# Patient Record
Sex: FEMALE | Race: BLACK OR AFRICAN AMERICAN | NOT HISPANIC OR LATINO | Employment: FULL TIME | URBAN - METROPOLITAN AREA
[De-identification: names, ages, dates, MRNs, and addresses within clinical notes are randomized per-mention and may not be internally consistent; named-entity substitution may affect disease eponyms.]

---

## 2017-02-03 RX ORDER — ACYCLOVIR 50 MG/G
CREAM TOPICAL
Qty: 5 G | Refills: 0 | OUTPATIENT
Start: 2017-02-03

## 2017-02-23 ENCOUNTER — OFFICE VISIT (OUTPATIENT)
Dept: INTERNAL MEDICINE | Age: 37
End: 2017-02-23

## 2017-02-23 VITALS
HEART RATE: 90 BPM | BODY MASS INDEX: 24.4 KG/M2 | SYSTOLIC BLOOD PRESSURE: 118 MMHG | TEMPERATURE: 99 F | HEIGHT: 68 IN | OXYGEN SATURATION: 99 % | WEIGHT: 161 LBS | DIASTOLIC BLOOD PRESSURE: 62 MMHG

## 2017-02-23 DIAGNOSIS — J01.11 ACUTE RECURRENT FRONTAL SINUSITIS: Primary | ICD-10-CM

## 2017-02-23 DIAGNOSIS — B37.31 VAGINAL YEAST INFECTION: ICD-10-CM

## 2017-02-23 PROCEDURE — 99214 OFFICE O/P EST MOD 30 MIN: CPT | Performed by: NURSE PRACTITIONER

## 2017-02-23 RX ORDER — CETIRIZINE HYDROCHLORIDE 10 MG/1
TABLET ORAL DAILY
COMMUNITY
Start: 2013-04-16 | End: 2018-07-17 | Stop reason: ALTCHOICE

## 2017-02-23 RX ORDER — ACETAMINOPHEN 500 MG
TABLET ORAL
Refills: 0 | COMMUNITY
Start: 2017-02-03 | End: 2019-04-18

## 2017-02-23 RX ORDER — MOXIFLOXACIN HYDROCHLORIDE 400 MG/1
400 TABLET ORAL DAILY
Qty: 10 TABLET | Refills: 0 | Status: SHIPPED | OUTPATIENT
Start: 2017-02-23 | End: 2017-08-25

## 2017-02-23 RX ORDER — BACLOFEN 10 MG/1
20 TABLET ORAL DAILY
COMMUNITY
Start: 2017-01-06 | End: 2017-08-25

## 2017-02-23 RX ORDER — FLUCONAZOLE 150 MG/1
150 TABLET ORAL ONCE
Qty: 1 TABLET | Refills: 0 | Status: SHIPPED | OUTPATIENT
Start: 2017-02-23 | End: 2017-02-23

## 2017-02-23 NOTE — PROGRESS NOTES
Sera Xavier / 36 y.o. / female  02/23/2017      CC:  Main reason(s) for today's visit: Cough; Sinus Problem (drainage states low grade fever); and URI (pt states has a sinus infection x 2 weeks)      HPI:   She presents to the office with complaints of cough, sore throat, low grade fever, headache, and right-sided frontal sinus pain ×2 weeks.  Patient also reports increase oral drainage and nasal congestion.    The following portions of the patient's history were reviewed and updated as appropriate: past medical history and past surgical history.    ASSESSMENT & PLAN:    Problem List Items Addressed This Visit     None      Visit Diagnoses     Acute recurrent frontal sinusitis    -  Primary    Relevant Medications    moxifloxacin (AVELOX) 400 MG tablet    Vaginal yeast infection        Relevant Medications    fluconazole (DIFLUCAN) 150 MG tablet        No orders of the defined types were placed in this encounter.      · Summary/Discussion:     1.  Acute recurrent frontal sinusitis: Patient presents to the office with complaints of nasal congestion, right-sided frontal sinus pain, low-grade fever and headache.  Upon examination patient does have right-sided frontal sinus pressure.  Nasal mucosa is eyelid with swelling turbinates in the left near.  Patient's right ear appears to be red with some effusion.  Oral mucosa is pink with clear thin drainage.  No other abnormalities noted.  Patient was advised that we will call in an antibiotic. She is to continue nasal corticosteroid as previously ordered. Also increase her oral fluids.  Patient was advised to monitor for worsening of symptoms and contact the office if symptoms do not improve.  Patient does report that she typically will have a yeast infection with the use of antibiotics until Diflucan will also be called into her pharmacy.  Follow-up: Return if symptoms worsen or fail to improve.     No future  appointments.  ____________________________________________________________________    REVIEW OF SYSTEMS    Review of Systems   Constitutional: Positive for fever. Negative for activity change, appetite change, chills, diaphoresis and fatigue.   HENT: Positive for ear pain (Right ear pain), sinus pressure (Right side) and sore throat. Negative for congestion, dental problem, ear discharge, facial swelling, hearing loss, mouth sores, trouble swallowing and voice change.    Eyes: Negative.    Respiratory: Positive for cough. Negative for apnea, choking, chest tightness, shortness of breath, wheezing and stridor.    Cardiovascular: Negative for chest pain, palpitations and leg swelling.   Gastrointestinal: Negative for abdominal distention, abdominal pain, anal bleeding, blood in stool, constipation, diarrhea, nausea, rectal pain and vomiting.   Endocrine: Negative for cold intolerance and heat intolerance.   Genitourinary: Negative for decreased urine volume, difficulty urinating, dysuria, enuresis, flank pain, frequency, genital sores, hematuria and urgency.   Musculoskeletal: Negative for arthralgias, back pain, gait problem, joint swelling, myalgias, neck pain and neck stiffness.   Skin: Negative for color change, pallor, rash and wound.   Allergic/Immunologic: Negative for environmental allergies, food allergies and immunocompromised state.   Neurological: Positive for headaches. Negative for dizziness, tremors, seizures, syncope, facial asymmetry, speech difficulty, weakness, light-headedness and numbness.   Hematological: Negative for adenopathy. Does not bruise/bleed easily.   Psychiatric/Behavioral: Negative for agitation, confusion, decreased concentration, self-injury, sleep disturbance and suicidal ideas. The patient is not nervous/anxious and is not hyperactive.    All other systems reviewed and are negative.    Other: As noted per HPI      VITALS    Visit Vitals   • /62   • Pulse 90   • Temp 99 °F  "(37.2 °C)   • Ht 68\" (172.7 cm)   • Wt 161 lb (73 kg)   • SpO2 99%   • BMI 24.48 kg/m2     BP Readings from Last 3 Encounters:   02/23/17 118/62   02/26/15 132/76   05/16/14 110/70     Wt Readings from Last 3 Encounters:   02/23/17 161 lb (73 kg)   02/26/15 163 lb 0.1 oz (73.9 kg)   05/16/14 163 lb 0.1 oz (73.9 kg)      Body mass index is 24.48 kg/(m^2).    PHYSICAL EXAMINATION    Physical Exam   Constitutional: She is oriented to person, place, and time. She appears well-developed and well-nourished.   HENT:   Head: Normocephalic.   Right Ear: External ear and ear canal normal. A middle ear effusion is present.   Left Ear: Tympanic membrane, external ear and ear canal normal.   Nose: Mucosal edema and rhinorrhea present. Right sinus exhibits frontal sinus tenderness.   Mouth/Throat: Uvula is midline, oropharynx is clear and moist and mucous membranes are normal.   Eyes: Conjunctivae are normal. Pupils are equal, round, and reactive to light.   Neck: Normal range of motion.   Cardiovascular: Normal rate and regular rhythm.    Pulmonary/Chest: Effort normal and breath sounds normal.   Musculoskeletal: Normal range of motion.   Neurological: She is alert and oriented to person, place, and time.   Skin: Skin is warm and dry.   Psychiatric: She has a normal mood and affect. Her behavior is normal.   Vitals reviewed.      REVIEWED DATA:    Labs:   No results found for: NA, K, AST, ALT, BUN, CREATININE, EGFRIFNONA, EGFRIFAFRI    No results found for: GLU, HGBA1C    No results found for: LDL, HDL, TRIG, CHOLHDLRATIO    No results found for: TSH, FREET4       Lab Results   Component Value Date    WBC 3.33 (L) 10/10/2014    HGB 11.3 (L) 10/10/2014     10/10/2014        Imaging:        Medical Tests:        Summary of old records / correspondence / consultant report:        Request outside records:          ALLERGIES:    Penicillins and Sulfa antibiotics    MEDICATIONS:  Current Outpatient Prescriptions   Medication " Sig Dispense Refill   • baclofen (LIORESAL) 10 MG tablet Take 20 mg by mouth Daily.     • cetirizine (ZYRTEC ALLERGY) 10 MG tablet Take  by mouth Daily.     • MUCUS RELIEF D  MG tablet TK 1 T PO QID PRF CONGESTION  0   • fluconazole (DIFLUCAN) 150 MG tablet Take 1 tablet by mouth 1 (One) Time for 1 dose. 1 tablet 0   • moxifloxacin (AVELOX) 400 MG tablet Take 1 tablet by mouth Daily. 10 tablet 0     No current facility-administered medications for this visit.        [facility]    The following portions of the patient's history were reviewed and updated as appropriate: Allergies / Current Medications / Past Medical History / Surgical History / Social History / Family History    PROBLEM LIST:    There is no problem list on file for this patient.      PAST MEDICAL HX:    Past Medical History   Diagnosis Date   • H/O cold sores        PAST SURGICAL HX:    Past Surgical History   Procedure Laterality Date   • Hysterectomy         SOCIAL HX:    Social History     Social History   • Marital status: Single     Spouse name: N/A   • Number of children: N/A   • Years of education: N/A     Social History Main Topics   • Smoking status: Never Smoker   • Smokeless tobacco: Never Used      Comment: socially   • Alcohol use None   • Drug use: None   • Sexual activity: Not Asked     Other Topics Concern   • None     Social History Narrative   • None       FAMILY HX:    No family history on file.

## 2017-03-06 ENCOUNTER — TELEPHONE (OUTPATIENT)
Dept: INTERNAL MEDICINE | Age: 37
End: 2017-03-06

## 2017-03-06 DIAGNOSIS — J01.11 ACUTE RECURRENT FRONTAL SINUSITIS: ICD-10-CM

## 2017-03-06 RX ORDER — MOXIFLOXACIN HYDROCHLORIDE 400 MG/1
400 TABLET ORAL DAILY
Qty: 10 TABLET | Refills: 0 | Status: CANCELLED | OUTPATIENT
Start: 2017-03-06

## 2017-03-06 NOTE — TELEPHONE ENCOUNTER
Pt was seen by SOLO Loco 2/23/2017 for Acute Recurrent Frontal Sinusitis. Rx'd Avelox 400mg 1 PO QD.      Pt is now c/o Rt ear infection and green nasal drainage. Pt is requesting Rx for another round of Avelox 400mg.     Please advise.     KD

## 2017-03-06 NOTE — TELEPHONE ENCOUNTER
Error pt took this medication for 10 days and would have been done on the 4th. Therefore Gig would like pt to wait a few days for these antibiotics to take affect before prescribing anymore. Pt informed

## 2017-03-06 NOTE — TELEPHONE ENCOUNTER
Please call in Avelox, 400mg, daily for 5 days. If she does not improve, then she needs to come in to be evaluated.

## 2017-03-06 NOTE — TELEPHONE ENCOUNTER
Pt was seen by SOLO Loco 2/23/2017 for Acute Recurrent Frontal Sinusitis. Rx'd Avelox 400mg 1 PO QD.     Pt is now c/o Rt ear infection and green nasal drainage. Pt is requesting Rx for another round of Avelox 400mg.    Pt has not been seen by Dr. Sharif since 2/26/2015.    Please advise.    KD

## 2017-03-16 ENCOUNTER — TELEPHONE (OUTPATIENT)
Dept: INTERNAL MEDICINE | Age: 37
End: 2017-03-16

## 2017-08-09 ENCOUNTER — TELEPHONE (OUTPATIENT)
Dept: INTERNAL MEDICINE | Age: 37
End: 2017-08-09

## 2017-08-09 DIAGNOSIS — N63.20 LEFT BREAST LUMP: Primary | ICD-10-CM

## 2017-08-09 NOTE — TELEPHONE ENCOUNTER
Pt called stating she found a nodule in her left breast. Denies any tenderness. Pt asking who Dr Sharif would recommend her to see?   Pt said does not want to go to [facility].  Pt is aware that Dr Sharif is out today, but asking if anyone else could refer her out.  Pt's # [phone]  Thanks SP

## 2017-08-09 NOTE — TELEPHONE ENCOUNTER
She does not need a gynecology referral for this anyway.  Needs a breast surgery referral.  Have entered a referral to Dr. Kenan Maya who does this type of evaluation.

## 2017-08-25 ENCOUNTER — OFFICE VISIT (OUTPATIENT)
Dept: MAMMOGRAPHY | Facility: CLINIC | Age: 37
End: 2017-08-25

## 2017-08-25 ENCOUNTER — OFFICE VISIT (OUTPATIENT)
Dept: INTERNAL MEDICINE | Age: 37
End: 2017-08-25

## 2017-08-25 VITALS
OXYGEN SATURATION: 100 % | DIASTOLIC BLOOD PRESSURE: 78 MMHG | HEART RATE: 100 BPM | TEMPERATURE: 98.7 F | WEIGHT: 158 LBS | BODY MASS INDEX: 23.95 KG/M2 | HEIGHT: 68 IN | SYSTOLIC BLOOD PRESSURE: 144 MMHG

## 2017-08-25 VITALS
SYSTOLIC BLOOD PRESSURE: 118 MMHG | OXYGEN SATURATION: 99 % | BODY MASS INDEX: 24.1 KG/M2 | WEIGHT: 159 LBS | HEIGHT: 68 IN | HEART RATE: 88 BPM | TEMPERATURE: 98.2 F | DIASTOLIC BLOOD PRESSURE: 62 MMHG

## 2017-08-25 DIAGNOSIS — N63.0 LUMP OR MASS IN BREAST: Primary | ICD-10-CM

## 2017-08-25 DIAGNOSIS — R01.1 MURMUR, CARDIAC: Primary | ICD-10-CM

## 2017-08-25 PROCEDURE — 99213 OFFICE O/P EST LOW 20 MIN: CPT | Performed by: INTERNAL MEDICINE

## 2017-08-25 PROCEDURE — 99203 OFFICE O/P NEW LOW 30 MIN: CPT | Performed by: SURGERY

## 2017-08-25 RX ORDER — CLOBETASOL PROPIONATE 0.5 MG/G
AEROSOL, FOAM TOPICAL
Refills: 5 | COMMUNITY
Start: 2017-07-14 | End: 2018-07-17

## 2017-08-25 NOTE — PROGRESS NOTES
Chief Complaint: Sera Xavier is a 37 y.o.. female here today for Mass (left breast)        History of Present Illness:  Patient presents with breast mass.  She is a nice 37-year-old female who 2 months ago noticed a lump in the upper outer quadrant of the left breast.  She described it as being the size of a pea.  It was not tender and although it has not grown in size it has persisted.  There has been no change with her menstrual cycle.  She also gives no history of trauma to the breast.  Imaging studies have not been performed.  There is no past history of breast biopsies.  Her family history is negative for breast cancer or ovarian cancer.      Review of Systems:  Review of Systems   Skin:        The pt denies any noticeable changes to the skin of the breast.    All other systems reviewed and are negative.       Past Medical and Surgical History:  Breast Biopsy History:  Patient has not had a breast biopsy in the past.  Breast Cancer HIstory:  Patient does not have a past medical history of breast cancer.  Breast Operations, and year:  None    History   Smoking Status   • Never Smoker   Smokeless Tobacco   • Never Used     Comment: socially     Obstetric History:  Patient is postmenopausal due to removal of her uterus in the following year:2014   Number of pregnancies:1  Number of live births: 1  Number of abortions or miscarriages: 0  Age of delivery of first child: 22  Patient breast fed, for the following lenth of time:2 weeks  Length of time taking birth control pills:8 years  Patient has never taken hormone replacement    Past Surgical History:   Procedure Laterality Date   • HYSTERECTOMY         Past Medical History:   Diagnosis Date   • H/O cold sores    • Seasonal allergies        Prior Hospitalizations, other than for surgery or childbirth, and year:  none    Social History:  Patient is single.  Patient has 1 daughters.    Family History:  Family History   Problem Relation Age of Onset   • Alcohol  abuse Maternal Aunt    • Asthma Paternal Grandmother    • Diabetes Paternal Grandmother    • Heart attack Paternal Grandmother    • Heart failure Paternal Grandmother    • Hypertension Paternal Grandmother    • Alcohol abuse Paternal Grandfather    • Hyperthyroidism Mother        Vital Signs:  Vitals:    08/25/17 1026   BP: 118/62   Pulse: 88   Temp: 98.2 °F (36.8 °C)   SpO2: 99%       Medications:    Current Outpatient Prescriptions:     Current Outpatient Prescriptions:   •  cetirizine (ZYRTEC ALLERGY) 10 MG tablet, Take  by mouth Daily., Disp: , Rfl:   •  clobetasol (OLUX) 0.05 % topical foam, APPLY QAM AND AT BEDTIME VERY SPARINGLY, Disp: , Rfl: 5  •  MUCUS RELIEF D  MG tablet, TK 1 T PO QID PRF CONGESTION, Disp: , Rfl: 0    Physical Examination:  General Appearance:   Patient is in no distress.  She is well kept and has an average build.   Psychiatric:  Patient with appropriate mood and affect. Alert and oriented to self, time, and place.    Breast, RIGHT:  small sized, symmetric with the contralateral side.  Breast skin is without erythema, edema, rashes.  There are no visible abnormalities upon inspection during the arm-raising maneuver or with hands on hips in the sitting position. There is no nipple retraction, discharge or nipple/areolar skin changes.There are no masses palpable in the sitting or supine positions.    Breast, LEFT:  small sized, symmetric with the contralateral side.  Breast skin is without erythema, edema, rashes.  There are no visible abnormalities upon inspection during the arm-raising maneuver or with hands on hips in the sitting position. There is no nipple retraction, discharge or nipple/areolar skin changes.There is an 8 mm well circumscribed superficial mass 2 fingerbreadths out from the edge of the areola in the 2 o'clock position.  It is not attached to the skin in anyway and mobile.    Lymphatic:  There is no axillary, cervical, infraclavicular, or supraclavicular  adenopathy bilaterally.    Cardiovascular:  Heart rate and rhythm are regular.  Respiratory:  Lungs are clear bilaterally with no crackles or wheezes in any lung field.  Gastrointestinal:  Abdomen is soft, nondistended, and nontender.  There was no evidence of hepatosplenomegaly or abdominal mass.  She does have an umbilical ring present.  There are no scars from previous surgery.    Musculoskeletal:  Good strength in all 4 extremities.   There is good range of motion in both shoulders.        Assessment:  1. Lump or mass in breast          Plan:  I do feel there is a distinct lump in the breast.  Its features are generally benign in that it is well circumscribed and fairly smooth.  I would recommend imaging studies and therefore have ordered mammograms and an ultrasound.  I will be speaking with her after those results return.      CPT coding:    Next Appointment:  No Follow-up on file.

## 2017-08-25 NOTE — PROGRESS NOTES
"Sera Xavier / 37 y.o. / female  08/25/2017  VITALS    /78  Pulse 100  Temp 98.7 °F (37.1 °C)  Ht 68\" (172.7 cm)  Wt 158 lb (71.7 kg)  SpO2 100%  BMI 24.02 kg/m2  BP Readings from Last 3 Encounters:   08/25/17 144/78   08/25/17 118/62   02/23/17 118/62     Wt Readings from Last 3 Encounters:   08/25/17 158 lb (71.7 kg)   08/25/17 159 lb (72.1 kg)   02/23/17 161 lb (73 kg)      Body mass index is 24.02 kg/(m^2).    CC:  Main reason(s) for today's visit: Heart Murmur (per Dr Maya)      HPI:     Patient was being seen by breast surgery, Dr. Maya for breast lump on the left side.  She has upcoming ultrasound and mammographic imaging to be scheduled, by clinical characteristics surgery does not feel it is anything of concern at this time.    While being evaluated, a murmur was noted.  Patient does have a past medical history of murmur, echocardiogram was done while at [facility] in the remote past.  There is nothing on the care everywhere section of the computer.  She works out regularly at the gym and has noted no reduction in exercise tolerance nor orthostatic changes.    Patient Care Team:  John Sharif MD as PCP - General  John Sharif MD as PCP - Family Medicine    ____________________________________________________________________    ASSESSMENT & PLAN:    Problem List Items Addressed This Visit     None      Visit Diagnoses     Murmur, cardiac    -  Primary    Relevant Orders    Adult Transthoracic Echo Complete        Orders Placed This Encounter   Procedures   • Adult Transthoracic Echo Complete       Summary/Discussion:     · Number-one history of cardiac murmur, not demonstrable by my exam today.  Patient may well have a murmur of mitral valve prolapse.  To definitively identify this situation, I recommend echocardiogram which will be  scheduled at the hospital at her convenience.      Return if symptoms worsen or fail to improve.    No future " appointments.    ______________________________________________________    REVIEW OF SYSTEMS    Review of Systems   Respiratory: Negative for chest tightness and shortness of breath.    Cardiovascular: Negative for chest pain and palpitations.        No PND nor orthopnea noted.         PHYSICAL EXAMINATION    Physical Exam   Constitutional: She is oriented to person, place, and time. She appears well-developed and well-nourished. No distress.   Cardiovascular: Normal rate, regular rhythm, normal heart sounds and intact distal pulses.  Exam reveals no gallop and no friction rub.    No murmur heard.  No murmur audible with patient supine at 45°, left lateral decubitus position, standing upright and squatting.   Pulmonary/Chest: Effort normal and breath sounds normal. She has no wheezes. She has no rales.   Musculoskeletal: She exhibits no edema.   Neurological: She is alert and oriented to person, place, and time.   Skin: Skin is warm and dry. No rash noted.   Psychiatric: She has a normal mood and affect. Her behavior is normal. Judgment and thought content normal.   Nursing note and vitals reviewed.      REVIEWED DATA:    Labs:     No results found for: NA, K, AST, ALT, BUN, CREATININE, EGFRIFNONA, EGFRIFAFRI    No results found for: HGBA1C, GLU, MICROALBUR    No results found for: LDL, HDL, TRIG, CHOLHDLRATIO    No results found for: TSH, FREET4    Lab Results   Component Value Date    WBC 3.33 (L) 10/10/2014    HGB 11.3 (L) 10/10/2014     10/10/2014       Imaging:        Medical Tests:        Summary of old records / correspondence / consultant report:        Request outside records:        Allergies   Allergen Reactions   • Amoxicillin Hives   • Benzonatate      DO NOT HELP   • Levofloxacin Hives   • Penicillins    • Sulfa Antibiotics    • Sulfamethoxazole-Trimethoprim        Current Outpatient Prescriptions on File Prior to Visit   Medication Sig Dispense Refill   • cetirizine (ZYRTEC ALLERGY) 10 MG  tablet Take  by mouth Daily.     • clobetasol (OLUX) 0.05 % topical foam APPLY QAM AND AT BEDTIME VERY SPARINGLY  5   • MUCUS RELIEF D  MG tablet TK 1 T PO QID PRF CONGESTION  0   • [DISCONTINUED] baclofen (LIORESAL) 10 MG tablet Take 20 mg by mouth Daily.     • [DISCONTINUED] moxifloxacin (AVELOX) 400 MG tablet Take 1 tablet by mouth Daily. 10 tablet 0     No current facility-administered medications on file prior to visit.        PFSH:     The following portions of the patient's history were reviewed and updated as appropriate: Allergies / Current Medications / Past Medical History / Surgical History / Social History / Family History    Patient Active Problem List   Diagnosis   • Vascular headache       Past Medical History:   Diagnosis Date   • H/O cold sores    • Heart murmur    • Seasonal allergies        Past Surgical History:   Procedure Laterality Date   • HYSTERECTOMY         Social History     Social History   • Marital status: Single     Spouse name: N/A   • Number of children: 1   • Years of education: N/A     Occupational History   • [employer]      Social History Main Topics   • Smoking status: Never Smoker   • Smokeless tobacco: Never Used      Comment: socially   • Alcohol use Yes      Comment: rum once or twice a month   • Drug use: No   • Sexual activity: Defer      Comment: single     Other Topics Concern   • None     Social History Narrative       Family History   Problem Relation Age of Onset   • Alcohol abuse Maternal Aunt    • Asthma Paternal Grandmother    • Diabetes Paternal Grandmother    • Heart attack Paternal Grandmother    • Heart failure Paternal Grandmother    • Hypertension Paternal Grandmother    • Alcohol abuse Paternal Grandfather    • Hyperthyroidism Mother          **Dragon Disclaimer:   Much of this encounter note is an electronic transcription/translation of spoken language to printed text. The electronic translation of spoken language may permit erroneous, or at  times, nonsensical words or phrases to be inadvertently transcribed. Although I have reviewed the note for such errors, some may still exist.

## 2017-09-26 ENCOUNTER — APPOINTMENT (OUTPATIENT)
Dept: WOMENS IMAGING | Facility: HOSPITAL | Age: 37
End: 2017-09-26

## 2017-09-26 PROCEDURE — G0279 TOMOSYNTHESIS, MAMMO: HCPCS | Performed by: RADIOLOGY

## 2017-09-26 PROCEDURE — 77062 BREAST TOMOSYNTHESIS BI: CPT | Performed by: RADIOLOGY

## 2017-09-26 PROCEDURE — 77066 DX MAMMO INCL CAD BI: CPT | Performed by: RADIOLOGY

## 2017-09-26 PROCEDURE — 76641 ULTRASOUND BREAST COMPLETE: CPT | Performed by: RADIOLOGY

## 2017-09-26 PROCEDURE — G0204 DX MAMMO INCL CAD BI: HCPCS | Performed by: RADIOLOGY

## 2017-10-02 DIAGNOSIS — N63.0 LUMP OR MASS IN BREAST: Primary | ICD-10-CM

## 2017-10-19 ENCOUNTER — TELEPHONE (OUTPATIENT)
Dept: MAMMOGRAPHY | Facility: CLINIC | Age: 37
End: 2017-10-19

## 2017-10-19 DIAGNOSIS — N63.0 LUMP OR MASS IN BREAST: Primary | ICD-10-CM

## 2017-10-19 NOTE — TELEPHONE ENCOUNTER
The mammogram and ultrasound show multiple masses in both breasts.  These are virtually all consistent with cysts.  Some contained debris or are clustered.  6 month bilateral mammograms and ultrasounds have been recommended.  I left a message for her to call us regarding that.  I did go ahead and placed the orders today.

## 2017-10-30 ENCOUNTER — TELEPHONE (OUTPATIENT)
Dept: INTERNAL MEDICINE | Age: 37
End: 2017-10-30

## 2017-10-30 NOTE — TELEPHONE ENCOUNTER
Requesting a refill on Acalacyclovir 1mg. She has cold sores and has no medication.   Asha on [address] [phone]

## 2017-10-30 NOTE — TELEPHONE ENCOUNTER
Pt is requesting Rx for Acyclovir 1mg, previously Rx'd for h/o recurrent cold sores. However, only Rx in pt history is Zovirax 5% ex cream (SIG: apply to affected are Q 2 hrs.) in [employer].    Please advise.    KD

## 2017-10-31 DIAGNOSIS — B00.1 RECURRENT COLD SORES: Primary | ICD-10-CM

## 2017-10-31 RX ORDER — ACYCLOVIR 400 MG/1
400 TABLET ORAL 3 TIMES DAILY
Qty: 15 TABLET | Refills: 0 | Status: SHIPPED | OUTPATIENT
Start: 2017-10-31 | End: 2017-11-01 | Stop reason: ALTCHOICE

## 2017-11-01 DIAGNOSIS — Z86.19 HISTORY OF COLD SORES: Primary | ICD-10-CM

## 2017-11-01 RX ORDER — VALACYCLOVIR HYDROCHLORIDE 1 G/1
2000 TABLET, FILM COATED ORAL 2 TIMES DAILY
Qty: 4 TABLET | Refills: 0 | Status: SHIPPED | OUTPATIENT
Start: 2017-11-01 | End: 2018-07-17 | Stop reason: ALTCHOICE

## 2018-01-15 DIAGNOSIS — B00.1 RECURRENT COLD SORES: ICD-10-CM

## 2018-01-15 RX ORDER — ACYCLOVIR 400 MG/1
TABLET ORAL
Qty: 15 TABLET | Refills: 0 | Status: SHIPPED | OUTPATIENT
Start: 2018-01-15 | End: 2018-03-20 | Stop reason: SDUPTHER

## 2018-03-20 DIAGNOSIS — B00.1 RECURRENT COLD SORES: ICD-10-CM

## 2018-03-20 RX ORDER — ACYCLOVIR 400 MG/1
TABLET ORAL
Qty: 15 TABLET | Refills: 0 | Status: SHIPPED | OUTPATIENT
Start: 2018-03-20 | End: 2018-04-09 | Stop reason: SDUPTHER

## 2018-03-26 ENCOUNTER — OFFICE VISIT (OUTPATIENT)
Dept: INTERNAL MEDICINE | Age: 38
End: 2018-03-26

## 2018-03-26 VITALS
SYSTOLIC BLOOD PRESSURE: 118 MMHG | DIASTOLIC BLOOD PRESSURE: 76 MMHG | TEMPERATURE: 98.4 F | HEART RATE: 94 BPM | BODY MASS INDEX: 23.25 KG/M2 | HEIGHT: 68 IN | OXYGEN SATURATION: 99 % | WEIGHT: 153.4 LBS

## 2018-03-26 DIAGNOSIS — J01.00 SUBACUTE MAXILLARY SINUSITIS: Primary | ICD-10-CM

## 2018-03-26 PROCEDURE — 99213 OFFICE O/P EST LOW 20 MIN: CPT | Performed by: INTERNAL MEDICINE

## 2018-03-26 RX ORDER — FLUCONAZOLE 150 MG/1
150 TABLET ORAL ONCE
Qty: 1 TABLET | Refills: 0 | Status: SHIPPED | OUTPATIENT
Start: 2018-03-26 | End: 2018-03-26

## 2018-03-26 RX ORDER — MOXIFLOXACIN HYDROCHLORIDE 400 MG/1
400 TABLET ORAL DAILY
Qty: 10 TABLET | Refills: 0 | Status: SHIPPED | OUTPATIENT
Start: 2018-03-26 | End: 2019-04-18

## 2018-03-26 NOTE — PROGRESS NOTES
Subjective   Sera Xavier is a 37 y.o. female.     History of Present Illness patient presents today with upper respiratory symptoms for the past 3 weeks.  She notes facial congestion predominantly on the right side, she does have some dental discomfort with chewing with radiation to the right ear.  Drainage is purulent.  She is not coughing.  Home treatment mucus relief.  She is a nonsmoker.  Last menstrual period hysterectomy.  Her daughter also had a sinus infection approximately one month ago.    The following portions of the patient's history were reviewed and updated as appropriate: allergies, current medications, past medical history, past social history and problem list.    Review of Systems   Constitutional: Positive for fever. Negative for appetite change, chills and diaphoresis.        Fever was present last week but has not recurred since.   Gastrointestinal: Negative for diarrhea, nausea and vomiting.   Skin: Negative for rash.   Neurological: Negative for headaches.   Hematological: Negative for adenopathy.       Objective   Physical Exam   Constitutional: She is oriented to person, place, and time. She appears well-developed and well-nourished.   HENT:   Right Ear: Tympanic membrane and ear canal normal.   Left Ear: Tympanic membrane and ear canal normal.   Nose: Right sinus exhibits maxillary sinus tenderness. Right sinus exhibits no frontal sinus tenderness. Left sinus exhibits no maxillary sinus tenderness and no frontal sinus tenderness.   Mouth/Throat: No posterior oropharyngeal edema or posterior oropharyngeal erythema.   Neck: Normal range of motion. Neck supple.   Lymphadenopathy:     She has no cervical adenopathy.   Neurological: She is alert and oriented to person, place, and time.   Skin: Skin is warm and dry.   Psychiatric: She has a normal mood and affect. Her behavior is normal. Judgment and thought content normal.   Nursing note and vitals reviewed.      Assessment/Plan   Sera piedra  seen today for facial pain.    Diagnoses and all orders for this visit:    Subacute maxillary sinusitis    Other orders  -     moxifloxacin (AVELOX) 400 MG tablet; Take 1 tablet by mouth Daily.  -     fluconazole (DIFLUCAN) 150 MG tablet; Take 1 tablet by mouth 1 (One) Time for 1 dose.      Number-one sinusitis right maxillary sinus involvement.  Will treat with Avelox as above, patient is tolerated is the past with good result.  We'll also treat with Diflucan because she typically gets a yeast infection when treated with antibiotics.  We also discussed that she should discontinue the Zyrtec-D that she is taking and use plain Sudafed PE decongestant.  I also suggest Ocean Spray or times daily in her nose, vaporizer in her bedroom at night, and shower steam in the morning.  To follow-up with me if not improved in 10-14 days or sooner if her condition worsens.

## 2018-04-09 DIAGNOSIS — B00.1 RECURRENT COLD SORES: ICD-10-CM

## 2018-04-09 RX ORDER — ACYCLOVIR 400 MG/1
TABLET ORAL
Qty: 15 TABLET | Refills: 0 | Status: SHIPPED | OUTPATIENT
Start: 2018-04-09 | End: 2018-05-07 | Stop reason: SDUPTHER

## 2018-04-23 ENCOUNTER — TELEPHONE (OUTPATIENT)
Dept: MAMMOGRAPHY | Facility: CLINIC | Age: 38
End: 2018-04-23

## 2018-04-23 NOTE — TELEPHONE ENCOUNTER
Several attempts have been made to contact pt re: scheduling her diag mammo and us.      Last attempt to call pt by phone was 04/23/2018. Left message for pt to call back.      Unable to contact pt.      (See communications in the referral and scanned document of contact attempts.)      08/17/2018  One last attempt was made to contact patient on 08/09/2018, again left message. Unable to contact.    Letter sent by regular mail and registered letter also sent.

## 2018-05-07 DIAGNOSIS — B00.1 RECURRENT COLD SORES: ICD-10-CM

## 2018-05-07 RX ORDER — ACYCLOVIR 400 MG/1
TABLET ORAL
Qty: 15 TABLET | Refills: 0 | Status: SHIPPED | OUTPATIENT
Start: 2018-05-07 | End: 2018-06-08 | Stop reason: SDUPTHER

## 2018-06-08 DIAGNOSIS — B00.1 RECURRENT COLD SORES: ICD-10-CM

## 2018-06-08 RX ORDER — ACYCLOVIR 400 MG/1
TABLET ORAL
Qty: 15 TABLET | Refills: 0 | Status: SHIPPED | OUTPATIENT
Start: 2018-06-08 | End: 2018-06-27 | Stop reason: SDUPTHER

## 2018-06-27 DIAGNOSIS — B00.1 RECURRENT COLD SORES: ICD-10-CM

## 2018-06-27 RX ORDER — ACYCLOVIR 400 MG/1
TABLET ORAL
Qty: 15 TABLET | Refills: 0 | Status: SHIPPED | OUTPATIENT
Start: 2018-06-27 | End: 2018-07-18 | Stop reason: SDUPTHER

## 2018-07-17 ENCOUNTER — OFFICE VISIT (OUTPATIENT)
Dept: INTERNAL MEDICINE | Age: 38
End: 2018-07-17

## 2018-07-17 VITALS
WEIGHT: 147.7 LBS | SYSTOLIC BLOOD PRESSURE: 128 MMHG | HEART RATE: 110 BPM | OXYGEN SATURATION: 99 % | BODY MASS INDEX: 22.39 KG/M2 | TEMPERATURE: 98.9 F | DIASTOLIC BLOOD PRESSURE: 70 MMHG | HEIGHT: 68 IN

## 2018-07-17 DIAGNOSIS — S39.011A ABDOMINAL MUSCLE STRAIN, INITIAL ENCOUNTER: Primary | ICD-10-CM

## 2018-07-17 DIAGNOSIS — S39.012A LOW BACK STRAIN, INITIAL ENCOUNTER: ICD-10-CM

## 2018-07-17 PROCEDURE — 99213 OFFICE O/P EST LOW 20 MIN: CPT | Performed by: NURSE PRACTITIONER

## 2018-07-17 RX ORDER — LEVOCETIRIZINE DIHYDROCHLORIDE 5 MG/1
5 TABLET, FILM COATED ORAL EVERY EVENING
COMMUNITY

## 2018-07-17 RX ORDER — PREDNISONE 10 MG/1
TABLET ORAL
Refills: 0 | COMMUNITY
Start: 2018-04-16 | End: 2019-04-18

## 2018-07-17 NOTE — PATIENT INSTRUCTIONS
Low Back Strain    A strain is a stretch or tear in a muscle or the strong cords of tissue that attach muscle to bone (tendons). Strains of the lower back (lumbar spine) are a common cause of low back pain. A strain occurs when muscles or tendons are torn or are stretched beyond their limits. The muscles may become inflamed, resulting in pain and sudden muscle tightening (spasms). A strain can happen suddenly due to an injury (trauma), or it can develop gradually due to overuse.  There are three types of strains:  · Grade 1 is a mild strain involving a minor tear of the muscle fibers or tendons. This may cause some pain but no loss of muscle strength.  · Grade 2 is a moderate strain involving a partial tear of the muscle fibers or tendons. This causes more severe pain and some loss of muscle strength.  · Grade 3 is a severe strain involving a complete tear of the muscle or tendon. This causes severe pain and complete or nearly complete loss of muscle strength.    What are the causes?  This condition may be caused by:  · Trauma, such as a fall or a hit to the body.  · Twisting or overstretching the back. This may result from doing activities that require a lot of energy, such as lifting heavy objects.    What increases the risk?  The following factors may increase your risk of getting this condition:  · Playing contact sports.  · Participating in sports or activities that put excessive stress on the back and require a lot of bending and twisting, including:  ? Lifting weights or heavy objects.  ? Gymnastics.  ? Soccer.  ? Figure skating.  ? Snowboarding.  · Being overweight or obese.  · Having poor strength and flexibility.    What are the signs or symptoms?  Symptoms of this condition may include:  · Sharp or dull pain in the lower back that does not go away. Pain may extend to the buttocks.  · Stiffness.  · Limited range of motion.  · Inability to stand up straight due to stiffness or pain.  · Muscle spasms.    How  is this diagnosed?  This condition may be diagnosed based on:  · Your symptoms.  · Your medical history.  · A physical exam.  ? Your health care provider may push on certain areas of your back to determine the source of your pain.  ? You may be asked to bend forward, backward, and side to side to assess the severity of your pain and your range of motion.  · Imaging tests, such as:  ? X-rays.  ? MRI.    How is this treated?  Treatment for this condition may include:  · Applying heat and cold to the affected area.  · Medicines to help relieve pain and to relax your muscles (muscle relaxants).  · NSAIDs to help reduce swelling and discomfort.  · Physical therapy.    When your symptoms improve, it is important to gradually return to your normal routine as soon as possible to reduce pain, avoid stiffness, and avoid loss of muscle strength. Generally, symptoms should improve within 6 weeks of treatment. However, recovery time varies.  Follow these instructions at home:  Managing pain, stiffness, and swelling  · If directed, apply ice to the injured area during the first 24 hours after your injury.  ? Put ice in a plastic bag.  ? Place a towel between your skin and the bag.  ? Leave the ice on for 20 minutes, 2-3 times a day.  · If directed, apply heat to the affected area as often as told by your health care provider. Use the heat source that your health care provider recommends, such as a moist heat pack or a heating pad.  ? Place a towel between your skin and the heat source.  ? Leave the heat on for 20-30 minutes.  ? Remove the heat if your skin turns bright red. This is especially important if you are unable to feel pain, heat, or cold. You may have a greater risk of getting burned.  Activity  · Rest and return to your normal activities as told by your health care provider. Ask your health care provider what activities are safe for you.  · Avoid activities that take a lot of effort (are strenuous) for as long as told  by your health care provider.  · Do exercises as told by your health care provider.  General instructions    · Take over-the-counter and prescription medicines only as told by your health care provider.  · If you have questions or concerns about safety while taking pain medicine, talk with your health care provider.  · Do not drive or operate heavy machinery until you know how your pain medicine affects you.  · Do not use any tobacco products, such as cigarettes, chewing tobacco, and e-cigarettes. Tobacco can delay bone healing. If you need help quitting, ask your health care provider.  · Keep all follow-up visits as told by your health care provider. This is important.  How is this prevented?  · Warm up and stretch before being active.  · Cool down and stretch after being active.  · Give your body time to rest between periods of activity.  · Avoid:  ? Being physically inactive for long periods at a time.  ? Exercising or playing sports when you are tired or in pain.  · Use correct form when playing sports and lifting heavy objects.  · Use good posture when sitting and standing.  · Maintain a healthy weight.  · Sleep on a mattress with medium firmness to support your back.  · Make sure to use equipment that fits you, including shoes that fit well.  · Be safe and responsible while being active to avoid falls.  · Do at least 150 minutes of moderate-intensity exercise each week, such as brisk walking or water aerobics. Try a form of exercise that takes stress off your back, such as swimming or stationary cycling.  · Maintain physical fitness, including:  ? Strength.  ? Flexibility.  ? Cardiovascular fitness.  ? Endurance.  Contact a health care provider if:  · Your back pain does not improve after 6 weeks of treatment.  · Your symptoms get worse.  Get help right away if:  · Your back pain is severe.  · You are unable to stand or walk.  · You develop pain in your legs.  · You develop weakness in your buttocks or  legs.  · You have difficulty controlling when you urinate or when you have a bowel movement.  This information is not intended to replace advice given to you by your health care provider. Make sure you discuss any questions you have with your health care provider.  Document Released: 12/18/2006 Document Revised: 08/24/2017 Document Reviewed: 09/28/2016  [employer] Interactive Patient Education © 2017 [employer]

## 2018-07-17 NOTE — PROGRESS NOTES
"Roseline Xavier is a 38 y.o. female.     Muscle Pain   This is a new problem. The current episode started yesterday. The problem occurs constantly. The problem is unchanged. The pain occurs in the context of an injury (Had been lifting lots of boxes lately. Reports poor lifting technique the day prior to the onset of pain. ). Pain location: right flank and right lower back. The pain is mild. Exacerbated by: bending, twisting. Pertinent negatives include no abdominal pain, fatigue or fever. (She presents today because she reported feeling a \"lump\" to her right lateral side and is concerned she may have caused some injury. ) Past treatments include OTC NSAID. The treatment provided mild relief. Swelling location: right lateral flank. There is no history of chronic back pain.        The following portions of the patient's history were reviewed and updated as appropriate: allergies, current medications, past family history, past medical history, past social history, past surgical history and problem list.    Review of Systems   Constitutional: Negative for chills, fatigue and fever.   Gastrointestinal: Negative for abdominal pain.       Objective   Physical Exam   Constitutional: She appears well-developed and well-nourished. She is active. She does not appear ill. No distress.   Cardiovascular: Normal rate, regular rhythm and normal heart sounds.    Pulmonary/Chest: Effort normal and breath sounds normal. She has no decreased breath sounds. She has no wheezes. She has no rhonchi. She has no rales.   Musculoskeletal:        Lumbar back: She exhibits pain. She exhibits no tenderness and no bony tenderness.        Back:    Neurological: She is alert.   Nursing note and vitals reviewed.        Assessment/Plan   Problems Addressed this Visit     None      Visit Diagnoses     Abdominal muscle strain, initial encounter    -  Primary    Low back strain, initial encounter            1. Abdominal muscle strain, " initial encounter  No abnormality appreciated on exam. Likely muscle strain of low back and obliques from excessive lifting and poor lifting technique. Discussed treatment with heating pad, OTC NSAIDs, avoidance of lifting until improved. Follow-up as needed if no improvement in 1-2 weeks.    2. Low back strain, initial encounter

## 2018-07-18 DIAGNOSIS — B00.1 RECURRENT COLD SORES: ICD-10-CM

## 2018-07-18 RX ORDER — ACYCLOVIR 400 MG/1
TABLET ORAL
Qty: 15 TABLET | Refills: 5
Start: 2018-07-18 | End: 2019-01-07 | Stop reason: SDUPTHER

## 2018-07-23 DIAGNOSIS — B00.1 RECURRENT COLD SORES: ICD-10-CM

## 2018-07-23 RX ORDER — ACYCLOVIR 400 MG/1
TABLET ORAL
Qty: 15 TABLET | Refills: 5 | Status: SHIPPED | OUTPATIENT
Start: 2018-07-23 | End: 2019-01-07 | Stop reason: SDUPTHER

## 2018-09-26 ENCOUNTER — TELEPHONE (OUTPATIENT)
Dept: MAMMOGRAPHY | Facility: CLINIC | Age: 38
End: 2018-09-26

## 2018-09-26 NOTE — TELEPHONE ENCOUNTER
Please received letter, please call patient concerning missed appointments. Patient would like to explain.

## 2019-01-07 DIAGNOSIS — B00.1 RECURRENT COLD SORES: ICD-10-CM

## 2019-01-07 RX ORDER — ACYCLOVIR 400 MG/1
TABLET ORAL
Qty: 15 TABLET | Refills: 0 | Status: SHIPPED | OUTPATIENT
Start: 2019-01-07 | End: 2019-02-19 | Stop reason: SDUPTHER

## 2019-02-18 NOTE — TELEPHONE ENCOUNTER
Pt will need to either get established with a provider here or find her own provider. We cannot continue to prescribe this medication because pt has not been seen since 07.17.18 and has no provider listed.   Please advise patient.

## 2019-02-18 NOTE — TELEPHONE ENCOUNTER
Pt is requesting a rx refill of Acyclovir 400mg for reoccurring cold sores.    Asha/Ramon #[phone].

## 2019-02-18 NOTE — TELEPHONE ENCOUNTER
Pt is scheduled to est care with SOLO Haywood on 4/18/19.    Pls advise rx refill of Acyclovir 400mg for reoccurring cold sores.

## 2019-02-19 DIAGNOSIS — B00.1 RECURRENT COLD SORES: ICD-10-CM

## 2019-02-19 RX ORDER — ACYCLOVIR 400 MG/1
400 TABLET ORAL 3 TIMES DAILY
Qty: 15 TABLET | Refills: 0 | Status: SHIPPED | OUTPATIENT
Start: 2019-02-19 | End: 2019-02-25 | Stop reason: SDUPTHER

## 2019-02-25 DIAGNOSIS — B00.1 RECURRENT COLD SORES: ICD-10-CM

## 2019-02-25 RX ORDER — ACYCLOVIR 400 MG/1
400 TABLET ORAL 3 TIMES DAILY
Qty: 15 TABLET | Refills: 0 | Status: SHIPPED | OUTPATIENT
Start: 2019-02-25 | End: 2019-04-18 | Stop reason: SDUPTHER

## 2019-04-18 ENCOUNTER — OFFICE VISIT (OUTPATIENT)
Dept: INTERNAL MEDICINE | Age: 39
End: 2019-04-18

## 2019-04-18 VITALS
TEMPERATURE: 97.4 F | BODY MASS INDEX: 23.28 KG/M2 | OXYGEN SATURATION: 100 % | DIASTOLIC BLOOD PRESSURE: 80 MMHG | WEIGHT: 153.6 LBS | HEIGHT: 68 IN | SYSTOLIC BLOOD PRESSURE: 132 MMHG | HEART RATE: 99 BPM

## 2019-04-18 DIAGNOSIS — Z00.00 PREVENTATIVE HEALTH CARE: ICD-10-CM

## 2019-04-18 DIAGNOSIS — B00.1 RECURRENT COLD SORES: Primary | ICD-10-CM

## 2019-04-18 DIAGNOSIS — Z76.89 ENCOUNTER TO ESTABLISH CARE: ICD-10-CM

## 2019-04-18 DIAGNOSIS — R13.10 SWALLOWING DYSFUNCTION: ICD-10-CM

## 2019-04-18 LAB
25(OH)D3+25(OH)D2 SERPL-MCNC: 46.4 NG/ML (ref 30–100)
ALBUMIN SERPL-MCNC: 5.3 G/DL (ref 3.5–5.2)
ALBUMIN/GLOB SERPL: 2.5 G/DL
ALP SERPL-CCNC: 51 U/L (ref 39–117)
ALT SERPL-CCNC: 19 U/L (ref 1–33)
AST SERPL-CCNC: 14 U/L (ref 1–32)
BASOPHILS # BLD AUTO: 0.03 10*3/MM3 (ref 0–0.2)
BASOPHILS NFR BLD AUTO: 0.9 % (ref 0–1.5)
BILIRUB SERPL-MCNC: 1.7 MG/DL (ref 0.2–1.2)
BUN SERPL-MCNC: 15 MG/DL (ref 6–20)
BUN/CREAT SERPL: 16.9 (ref 7–25)
CALCIUM SERPL-MCNC: 9.9 MG/DL (ref 8.6–10.5)
CHLORIDE SERPL-SCNC: 104 MMOL/L (ref 98–107)
CO2 SERPL-SCNC: 24.8 MMOL/L (ref 22–29)
CREAT SERPL-MCNC: 0.89 MG/DL (ref 0.57–1)
EOSINOPHIL # BLD AUTO: 0.04 10*3/MM3 (ref 0–0.4)
EOSINOPHIL NFR BLD AUTO: 1.2 % (ref 0.3–6.2)
ERYTHROCYTE [DISTWIDTH] IN BLOOD BY AUTOMATED COUNT: 12 % (ref 12.3–15.4)
GLOBULIN SER CALC-MCNC: 2.1 GM/DL
GLUCOSE SERPL-MCNC: 83 MG/DL (ref 65–99)
HCT VFR BLD AUTO: 39.2 % (ref 34–46.6)
HGB BLD-MCNC: 13.1 G/DL (ref 12–15.9)
IMM GRANULOCYTES # BLD AUTO: 0 10*3/MM3 (ref 0–0.05)
IMM GRANULOCYTES NFR BLD AUTO: 0 % (ref 0–0.5)
LYMPHOCYTES # BLD AUTO: 0.99 10*3/MM3 (ref 0.7–3.1)
LYMPHOCYTES NFR BLD AUTO: 29.8 % (ref 19.6–45.3)
MCH RBC QN AUTO: 31.3 PG (ref 26.6–33)
MCHC RBC AUTO-ENTMCNC: 33.4 G/DL (ref 31.5–35.7)
MCV RBC AUTO: 93.8 FL (ref 79–97)
MONOCYTES # BLD AUTO: 0.29 10*3/MM3 (ref 0.1–0.9)
MONOCYTES NFR BLD AUTO: 8.7 % (ref 5–12)
NEUTROPHILS # BLD AUTO: 1.97 10*3/MM3 (ref 1.4–7)
NEUTROPHILS NFR BLD AUTO: 59.4 % (ref 42.7–76)
NRBC BLD AUTO-RTO: 0 /100 WBC (ref 0–0)
PLATELET # BLD AUTO: 148 10*3/MM3 (ref 140–450)
POTASSIUM SERPL-SCNC: 4.5 MMOL/L (ref 3.5–5.2)
PROT SERPL-MCNC: 7.4 G/DL (ref 6–8.5)
RBC # BLD AUTO: 4.18 10*6/MM3 (ref 3.77–5.28)
SODIUM SERPL-SCNC: 140 MMOL/L (ref 136–145)
TSH SERPL DL<=0.005 MIU/L-ACNC: 1.47 MIU/ML (ref 0.27–4.2)
WBC # BLD AUTO: 3.32 10*3/MM3 (ref 3.4–10.8)

## 2019-04-18 PROCEDURE — 99214 OFFICE O/P EST MOD 30 MIN: CPT | Performed by: NURSE PRACTITIONER

## 2019-04-18 RX ORDER — ACYCLOVIR 400 MG/1
400 TABLET ORAL 3 TIMES DAILY
Qty: 30 TABLET | Refills: 2 | Status: SHIPPED | OUTPATIENT
Start: 2019-04-18 | End: 2020-03-17

## 2019-04-18 RX ORDER — CHLORAL HYDRATE 500 MG
CAPSULE ORAL
COMMUNITY

## 2019-04-18 RX ORDER — OMEPRAZOLE 20 MG/1
20 TABLET, DELAYED RELEASE ORAL DAILY
COMMUNITY
Start: 2019-04-18

## 2019-04-18 NOTE — PROGRESS NOTES
[facility] [place] INTERNAL MEDICINE  Michelle Xavier / 39 y.o. / female  04/18/2019      ASSESSMENT & PLAN:    Problem List Items Addressed This Visit        Other    Recurrent cold sores - Primary    Relevant Medications    acyclovir (ZOVIRAX) 400 MG tablet      Other Visit Diagnoses     Swallowing dysfunction        Relevant Medications    omeprazole OTC (PRILOSEC OTC) 20 MG EC tablet    Other Relevant Orders    Ambulatory Referral to Gastroenterology    Encounter to establish care        Preventative health care        Relevant Orders    CBC & Differential    Comprehensive Metabolic Panel    TSH Rfx On Abnormal To Free T4    Vitamin D 25 Hydroxy        Orders Placed This Encounter   Procedures   • Comprehensive Metabolic Panel   • TSH Rfx On Abnormal To Free T4   • Vitamin D 25 Hydroxy   • Ambulatory Referral to Gastroenterology   • CBC & Differential     New Medications Ordered This Visit   Medications   • omeprazole OTC (PRILOSEC OTC) 20 MG EC tablet     Sig: Take 1 tablet by mouth Daily.   • acyclovir (ZOVIRAX) 400 MG tablet     Sig: Take 1 tablet by mouth 3 (Three) Times a Day. Take no more than 5 doses a day.     Dispense:  30 tablet     Refill:  2       Summary/Discussion:    1. Recurrent cold sores  Refills provided today to use as needed.    - acyclovir (ZOVIRAX) 400 MG tablet; Take 1 tablet by mouth 3 (Three) Times a Day. Take no more than 5 doses a day.  Dispense: 30 tablet; Refill: 2    2. Swallowing dysfunction  Patient likely needs EGD evaluation for swallowing difficulties/possible esophagitis.  Recommended can take over-the-counter Prilosec as needed for any burning or discomfort symptoms.  Referral placed to GI for further evaluation and management.    - omeprazole OTC (PRILOSEC OTC) 20 MG EC tablet; Take 1 tablet by mouth Daily.  - Ambulatory Referral to Gastroenterology    3. Encounter to establish care      4. Preventative health care    - CBC & Differential  - Comprehensive  "Metabolic Panel  - TSH Rfx On Abnormal To Free T4  - Vitamin D 25 Hydroxy        Return in about 1 year (around 4/18/2020) for Annual physical.    ____________________________________________________________________    MEDICATIONS  Current Outpatient Medications   Medication Sig Dispense Refill   • acyclovir (ZOVIRAX) 400 MG tablet Take 1 tablet by mouth 3 (Three) Times a Day. Take no more than 5 doses a day. 30 tablet 2   • Ascorbic Acid (VITAMIN C ER PO) Take  by mouth.     • BIOTIN PO Take  by mouth.     • COLLAGEN PO Take  by mouth.     • Cyanocobalamin (VITAMIN B12 PO) Take  by mouth.     • GLUCOSAMINE-CHONDROITIN PO Take  by mouth.     • levocetirizine (XYZAL) 5 MG tablet Take 5 mg by mouth Every Evening.     • Methylsulfonylmethane (MSM PO) Take  by mouth.     • Omega-3 Fatty Acids (FISH OIL) 1000 MG capsule capsule Take  by mouth Daily With Breakfast.     • omeprazole OTC (PRILOSEC OTC) 20 MG EC tablet Take 1 tablet by mouth Daily.       No current facility-administered medications for this visit.           VITALS:    Visit Vitals  /80   Pulse 99   Temp 97.4 °F (36.3 °C)   Ht 172.7 cm (68\")   Wt 69.7 kg (153 lb 9.6 oz)   SpO2 100%   BMI 23.35 kg/m²       BP Readings from Last 3 Encounters:   04/18/19 132/80   07/17/18 128/70   03/26/18 118/76     Wt Readings from Last 3 Encounters:   04/18/19 69.7 kg (153 lb 9.6 oz)   07/17/18 67 kg (147 lb 11.2 oz)   03/26/18 69.6 kg (153 lb 6.4 oz)      Body mass index is 23.35 kg/m².    CC:  Main reason(s) for today's visit: Recurrent Cold Sores (med refill; transfer from Dr. Sharif)      HPI: Patient here for medication refills and to establish care.  She was a previous patient of Dr. Sharif.    History of herpes simplex of the mouth.  Uses acyclovir on occasion as needed for outbreaks.     She also c/o recurrent episodes of discomfort when swallowing medications. She is on quite a few OTC supplemental medications. Reports having had history of previous symptoms in " the past, was evaluated with barium swallow study, told it could possibly be allergy-related. She reports symptoms have returned as of February and feels as if medications are getting stuck in lower esophagus when she swallows. She has not been taking anything for her symptoms.     Patient Care Team:  Michelle Lewis APRN as PCP - General (Internal Medicine)  John Sharif MD (Inactive) as PCP - Family Medicine    ____________________________________________________________________    REVIEW OF SYSTEMS    Review of Systems   Constitutional: Negative for activity change and unexpected weight change.   HENT: Positive for trouble swallowing.    Gastrointestinal: Negative for abdominal pain, nausea and vomiting.   Endocrine: Negative for cold intolerance and heat intolerance.         PHYSICAL EXAMINATION    Physical Exam   Constitutional: She is oriented to person, place, and time. Vital signs are normal. She appears well-developed and well-nourished. She is cooperative. She does not appear ill. No distress.   Cardiovascular: Normal rate, regular rhythm, S1 normal, S2 normal and normal heart sounds.   No murmur heard.  Pulmonary/Chest: Effort normal and breath sounds normal. She has no decreased breath sounds. She has no wheezes. She has no rhonchi. She has no rales.   Neurological: She is alert and oriented to person, place, and time.   Skin: Skin is warm, dry and intact.   Psychiatric: She has a normal mood and affect. Her speech is normal and behavior is normal. Judgment and thought content normal. Cognition and memory are normal.   Nursing note and vitals reviewed.      REVIEWED DATA:    Labs:     No results found for: NA, K, AST, ALT, BUN, CREATININE, EGFRIFNONA, EGFRIFAFRI    No results found for: HGBA1C, GLUCOSE, MICROALBUR    No results found for: LDL, HDL, TRIG, CHOLHDLRATIO    No results found for: TSH, FREET4    Lab Results   Component Value Date    WBC 3.33 (L) 10/10/2014    HGB 11.3 (L) 10/10/2014      10/10/2014       No results found for: PROTEIN, GLUCOSEU, BLOODU, NITRITEU, LEUKOCYTESUR    Imaging:         Medical Tests:         Summary of old records / correspondence / consultant report:         Request outside records:         ALLERGIES  Allergies   Allergen Reactions   • Amoxicillin Hives   • Benzonatate      DO NOT HELP   • Levofloxacin Hives   • Penicillins    • Sulfa Antibiotics    • Sulfamethoxazole-Trimethoprim         PFSH:     The following portions of the patient's history were reviewed and updated as appropriate: Allergies / Current Medications / Past Medical History / Surgical History / Social History / Family History    PROBLEM LIST   Patient Active Problem List   Diagnosis   • Vascular headache   • Recurrent cold sores       PAST MEDICAL HISTORY  Past Medical History:   Diagnosis Date   • Cyst of breast    • H/O cold sores    • Heart murmur    • Recurrent sinusitis    • Seasonal allergies        SURGICAL HISTORY  Past Surgical History:   Procedure Laterality Date   • HYSTERECTOMY      partial lap hysterectomy; no cervix        SOCIAL HISTORY  Social History     Socioeconomic History   • Marital status: Single     Spouse name: Not on file   • Number of children: 1   • Years of education: Not on file   • Highest education level: Not on file   Occupational History   • Occupation: [employer]   Tobacco Use   • Smoking status: Never Smoker   • Smokeless tobacco: Never Used   • Tobacco comment: socially   Substance and Sexual Activity   • Alcohol use: Yes     Comment: rum once or twice a month   • Drug use: No   • Sexual activity: Defer     Comment: single       FAMILY HISTORY  Family History   Problem Relation Age of Onset   • Alcohol abuse Maternal Aunt    • Cancer Maternal Aunt    • Asthma Paternal Grandmother    • Diabetes Paternal Grandmother    • Heart attack Paternal Grandmother    • Heart failure Paternal Grandmother    • Hypertension Paternal Grandmother    • Alcohol abuse Paternal  Grandfather    • Hyperthyroidism Mother    • Colon polyps Mother    • No Known Problems Father          **Lora Disclaimer:   Much of this encounter note is an electronic transcription/translation of spoken language to printed text. The electronic translation of spoken language may permit erroneous, or at times, nonsensical words or phrases to be inadvertently transcribed. Although I have reviewed the note for such errors, some may still exist.

## 2019-04-18 NOTE — PATIENT INSTRUCTIONS
Esophagitis    Esophagitis is inflammation of the esophagus. The esophagus is the tube that carries food and liquids from your mouth to your stomach. Esophagitis can cause soreness or pain in the esophagus. This condition can make it difficult and painful to swallow.  What are the causes?  Most causes of esophagitis are not serious. Common causes of this condition include:  · Gastroesophageal reflux disease (GERD). This is when stomach contents move back up into the esophagus (reflux).  · Repeated vomiting.  · An allergic-type reaction, especially caused by food allergies (eosinophilic esophagitis).  · Injury to the esophagus by swallowing large pills with or without water, or swallowing certain types of medicines.  · Swallowing (ingesting) harmful chemicals, such as household cleaning products.  · Heavy alcohol use.  · An infection of the esophagus. This most often occurs in people who have a weakened immune system.  · Radiation or chemotherapy treatment for cancer.  · Certain diseases such as sarcoidosis, Crohn disease, and scleroderma.    What are the signs or symptoms?  Symptoms of this condition include:  · Difficult or painful swallowing.  · Pain with swallowing acidic liquids, such as citrus juices.  · Pain with burping.  · Chest pain.  · Difficulty breathing.  · Nausea.  · Vomiting.  · Pain in the abdomen.  · Weight loss.  · Ulcers in the mouth.  · Patches of white material in the mouth (candidiasis).  · Fever.  · Coughing up blood or vomiting blood.  · Stool that is black, tarry, or bright red.    How is this diagnosed?  Your health care provider will take a medical history and perform a physical exam. You may also have other tests, including:  · An endoscopy to examine your stomach and esophagus with a small camera.  · A test that measures the acidity level in your esophagus.  · A test that measures how much pressure is on your esophagus.  · A barium swallow or modified barium swallow to show the shape,  size, and functioning of your esophagus.  · Allergy tests.    How is this treated?  Treatment for this condition depends on the cause of your esophagitis. In some cases, steroids or other medicines may be given to help relieve your symptoms or to treat the underlying cause of your condition. You may have to make some lifestyle changes, such as:  · Avoiding alcohol.  · Quitting smoking.  · Changing your diet.  · Exercising.  · Changing your sleep habits and your sleep environment.    Follow these instructions at home:  Take these actions to decrease your discomfort and to help avoid complications.  Diet  · Follow a diet as recommended by your health care provider. This may involve avoiding foods and drinks such as:  ? Coffee and tea (with or without caffeine).  ? Drinks that contain alcohol.  ? Energy drinks and sports drinks.  ? Carbonated drinks or sodas.  ? Chocolate and cocoa.  ? Peppermint and mint flavorings.  ? Garlic and onions.  ? Horseradish.  ? Spicy and acidic foods, including peppers, chili powder, dillon powder, vinegar, hot sauces, and barbecue sauce.  ? Citrus fruit juices and citrus fruits, such as oranges, manjinder, and limes.  ? Tomato-based foods, such as red sauce, chili, salsa, and pizza with red sauce.  ? Fried and fatty foods, such as donuts, french fries, potato chips, and high-fat dressings.  ? High-fat meats, such as hot dogs and fatty cuts of red and white meats, such as rib eye steak, sausage, ham, and castañeda.  ? High-fat dairy items, such as whole milk, butter, and cream cheese.  · Eat small, frequent meals instead of large meals.  · Avoid drinking large amounts of liquid with your meals.  · Avoid eating meals during the 2-3 hours before bedtime.  · Avoid lying down right after you eat.  · Do not exercise right after you eat.  · Avoid foods and drinks that seem to make your symptoms worse.  General instructions  · Pay attention to any changes in your symptoms.  · Take over-the-counter and  prescription medicines only as told by your health care provider. Do not take aspirin, ibuprofen, or other NSAIDs unless your health care provider told you to do so.  · If you have trouble taking pills, use a pill splitter to decrease the size of the pill. This will decrease the chance of the pill getting stuck or injuring your esophagus on the way down. Also, drink water after you take a pill.  · Do not use any tobacco products, including cigarettes, chewing tobacco, and e-cigarettes. If you need help quitting, ask your health care provider.  · Wear loose-fitting clothing. Do not wear anything tight around your waist that causes pressure on your abdomen.  · Raise (elevate) the head of your bed about 6 inches (15 cm).  · Try to reduce your stress, such as with yoga or meditation. If you need help reducing stress, ask your health care provider.  · If you are overweight, reduce your weight to an amount that is healthy for you. Ask your health care provider for guidance about a safe weight loss goal.  · Keep all follow-up visits as told by your health care provider. This is important.  Contact a health care provider if:  · You have new symptoms.  · You have unexplained weight loss.  · You have difficulty swallowing, or it hurts to swallow.  · You have wheezing or a persistent cough.  · Your symptoms do not improve with treatment.  · You have frequent heartburn for more than two weeks.  Get help right away if:  · You have severe pain in your arms, neck, jaw, teeth, or back.  · You feel sweaty, dizzy, or light-headed.  · You have chest pain or shortness of breath.  · You vomit and your vomit looks like blood or coffee grounds.  · Your stool is bloody or black.  · You have a fever.  · You cannot swallow, drink, or eat.  This information is not intended to replace advice given to you by your health care provider. Make sure you discuss any questions you have with your health care provider.  Document Released: 01/25/2006  Document Revised: 05/25/2017 Document Reviewed: 04/13/2016  [employer] Interactive Patient Education © 2019 [employer]

## 2019-04-19 ENCOUNTER — TELEPHONE (OUTPATIENT)
Dept: INTERNAL MEDICINE | Age: 39
End: 2019-04-19

## 2019-04-19 DIAGNOSIS — R17 ELEVATED BILIRUBIN: Primary | ICD-10-CM

## 2019-04-19 NOTE — TELEPHONE ENCOUNTER
LVM for pt, advising to hold supplements, and come in for fasting labs in 2-4 weeks. Pt was also advised of some symptoms of elevated bilirubin, and that Gilbert's Syndrome is asymptomatic.    KD

## 2019-04-19 NOTE — TELEPHONE ENCOUNTER
Because there is not much research data published on supplements, I cannot say for sure whether these effect bilirubin levels. I do not think glucosamine-chondroitin would have any effect. She may want to hold these supplements for now and recheck labs in the next 2-4 weeks.     Gilbert's syndrome is benign and asymptomatic.      Symptoms to watch for with elevated bilirubin is nausea, vomiting, jaundice, right upper abdominal pain.

## 2019-04-19 NOTE — TELEPHONE ENCOUNTER
Pt called re: lab results, specifically bilirubin.    The pt asked if her supplements, Triflex (glucosamine-chondtroitin) and MSM (Methylsulfonylmethane) might be effecting bilirubin levels.    Pt also wanted to know if this elevation, or build up, of bilirubin would cause any noticeable physical symptoms, or change how she feels.    Please advise.     She also had questions regarding Gilbert's Syndrome, wanting to know if this is dangerous, or should she be concerned.    KD

## 2019-06-27 ENCOUNTER — OFFICE VISIT (OUTPATIENT)
Dept: GASTROENTEROLOGY | Facility: CLINIC | Age: 39
End: 2019-06-27

## 2019-06-27 VITALS
BODY MASS INDEX: 22.88 KG/M2 | HEART RATE: 96 BPM | HEIGHT: 68 IN | SYSTOLIC BLOOD PRESSURE: 130 MMHG | DIASTOLIC BLOOD PRESSURE: 85 MMHG | WEIGHT: 151 LBS

## 2019-06-27 DIAGNOSIS — R13.19 ESOPHAGEAL DYSPHAGIA: ICD-10-CM

## 2019-06-27 DIAGNOSIS — K21.9 GASTROESOPHAGEAL REFLUX DISEASE, ESOPHAGITIS PRESENCE NOT SPECIFIED: Primary | ICD-10-CM

## 2019-06-27 PROCEDURE — 99204 OFFICE O/P NEW MOD 45 MIN: CPT | Performed by: INTERNAL MEDICINE

## 2019-06-27 RX ORDER — SODIUM CHLORIDE, SODIUM LACTATE, POTASSIUM CHLORIDE, CALCIUM CHLORIDE 600; 310; 30; 20 MG/100ML; MG/100ML; MG/100ML; MG/100ML
30 INJECTION, SOLUTION INTRAVENOUS CONTINUOUS
Status: CANCELLED | OUTPATIENT
Start: 2019-07-12

## 2019-06-27 RX ORDER — FLUTICASONE PROPIONATE 50 MCG
2 SPRAY, SUSPENSION (ML) NASAL DAILY
COMMUNITY
End: 2020-11-06 | Stop reason: ALTCHOICE

## 2019-06-27 RX ORDER — CETIRIZINE HYDROCHLORIDE 10 MG/1
10 TABLET ORAL DAILY
COMMUNITY

## 2019-06-27 NOTE — PROGRESS NOTES
Subjective   Sera Xavier is a 39 y.o.. female is being seen for consultation today at the request of SOLO Araujo    Chief Complaint   Patient presents with   • Difficulty Swallowing   • Heartburn       History of Present Illness  Patient has a history over the last several months of dysphasia, particular with solids and large pills.  She has frequent reflux as well.  She recently took omeprazole and that seems to have helped some.  She describes the symptoms as moderate.  Her discomfort occurs at the midsternal level.  She has numerous environmental allergies and routinely sees an allergist.  She has had no hematemesis, melena, or hematochezia.    The following portions of the patient's history were reviewed and updated as appropriate: allergies, current medications, past family history, past medical history, past social history, past surgical history and problem list.      Current Outpatient Medications:   •  cetirizine (zyrTEC) 10 MG tablet, Take 10 mg by mouth Daily., Disp: , Rfl:   •  fluticasone (FLONASE) 50 MCG/ACT nasal spray, 2 sprays into the nostril(s) as directed by provider Daily., Disp: , Rfl:   •  omeprazole OTC (PRILOSEC OTC) 20 MG EC tablet, Take 1 tablet by mouth Daily., Disp: , Rfl:   •  acyclovir (ZOVIRAX) 400 MG tablet, Take 1 tablet by mouth 3 (Three) Times a Day. Take no more than 5 doses a day., Disp: 30 tablet, Rfl: 2  •  Ascorbic Acid (VITAMIN C ER PO), Take  by mouth., Disp: , Rfl:   •  BIOTIN PO, Take  by mouth., Disp: , Rfl:   •  COLLAGEN PO, Take  by mouth., Disp: , Rfl:   •  Cyanocobalamin (VITAMIN B12 PO), Take  by mouth., Disp: , Rfl:   •  GLUCOSAMINE-CHONDROITIN PO, Take  by mouth., Disp: , Rfl:   •  levocetirizine (XYZAL) 5 MG tablet, Take 5 mg by mouth Every Evening., Disp: , Rfl:   •  Methylsulfonylmethane (MSM PO), Take  by mouth., Disp: , Rfl:   •  Omega-3 Fatty Acids (FISH OIL) 1000 MG capsule capsule, Take  by mouth Daily With Breakfast., Disp: , Rfl:     Family  History   Problem Relation Age of Onset   • Alcohol abuse Maternal Aunt    • Cancer Maternal Aunt    • Colon cancer Maternal Aunt    • Liver cancer Maternal Aunt    • Asthma Paternal Grandmother    • Diabetes Paternal Grandmother    • Heart attack Paternal Grandmother    • Heart failure Paternal Grandmother    • Hypertension Paternal Grandmother    • Alcohol abuse Paternal Grandfather    • Hyperthyroidism Mother    • Colon polyps Mother    • No Known Problems Father        Review of Systems   Constitutional: Negative for appetite change, diaphoresis, fatigue, fever and unexpected weight change.   HENT: Positive for trouble swallowing. Negative for hearing loss, mouth sores and sore throat.    Eyes: Negative for pain and redness.   Respiratory: Negative for choking and shortness of breath.    Cardiovascular: Negative for chest pain and leg swelling.   Gastrointestinal: Negative for abdominal distention, abdominal pain, anal bleeding, blood in stool, constipation, diarrhea, nausea, rectal pain and vomiting.   Genitourinary: Negative for flank pain and hematuria.   Musculoskeletal: Negative for arthralgias and joint swelling.   Skin: Negative for color change and rash.   Allergic/Immunologic: Negative for food allergies and immunocompromised state.   Neurological: Negative for dizziness, seizures and headaches.   Hematological: Does not bruise/bleed easily.   Psychiatric/Behavioral: Negative for confusion, sleep disturbance and suicidal ideas. The patient is not nervous/anxious.        Objective   Physical Exam   Constitutional: She is oriented to person, place, and time. She appears well-developed and well-nourished.   HENT:   Head: Normocephalic and atraumatic.   Right Ear: External ear normal.   Left Ear: External ear normal.   Nose: Nose normal.   Eyes: Conjunctivae are normal. Pupils are equal, round, and reactive to light.   Neck: Neck supple. No thyromegaly present.   Cardiovascular: Normal heart sounds. Exam  reveals no gallop and no friction rub.   No murmur heard.  Pulmonary/Chest: Effort normal and breath sounds normal.   Abdominal: Soft. Bowel sounds are normal. She exhibits no distension and no mass. There is no tenderness.   Musculoskeletal: She exhibits no edema.   Neurological: She is alert and oriented to person, place, and time.   Skin: No rash noted. No erythema.   Psychiatric: She has a normal mood and affect. Her behavior is normal.   Nursing note and vitals reviewed.      Pertinent old records were reviewed.     Assessment/Plan   Problems Addressed this Visit        Digestive    Gastroesophageal reflux disease - Primary    Relevant Orders    Case Request (Completed)    Esophageal dysphagia    Relevant Orders    Case Request (Completed)        It would not surprise me if the patient ends up having eosinophilic esophagitis.  In any case we will schedule her for an EGD.  She is to remain on omeprazole up until that time.

## 2019-07-12 ENCOUNTER — ANESTHESIA EVENT (OUTPATIENT)
Dept: GASTROENTEROLOGY | Facility: HOSPITAL | Age: 39
End: 2019-07-12

## 2019-07-12 ENCOUNTER — ANESTHESIA (OUTPATIENT)
Dept: GASTROENTEROLOGY | Facility: HOSPITAL | Age: 39
End: 2019-07-12

## 2019-07-12 ENCOUNTER — HOSPITAL ENCOUNTER (OUTPATIENT)
Facility: HOSPITAL | Age: 39
Setting detail: HOSPITAL OUTPATIENT SURGERY
Discharge: HOME | End: 2019-07-12
Attending: INTERNAL MEDICINE | Admitting: INTERNAL MEDICINE

## 2019-07-12 VITALS
SYSTOLIC BLOOD PRESSURE: 116 MMHG | OXYGEN SATURATION: 100 % | HEART RATE: 65 BPM | HEIGHT: 68 IN | TEMPERATURE: 98.6 F | DIASTOLIC BLOOD PRESSURE: 78 MMHG | WEIGHT: 155.19 LBS | RESPIRATION RATE: 16 BRPM | BODY MASS INDEX: 23.52 KG/M2

## 2019-07-12 DIAGNOSIS — K21.9 GASTROESOPHAGEAL REFLUX DISEASE, ESOPHAGITIS PRESENCE NOT SPECIFIED: ICD-10-CM

## 2019-07-12 DIAGNOSIS — R13.19 ESOPHAGEAL DYSPHAGIA: ICD-10-CM

## 2019-07-12 PROCEDURE — 43239 EGD BIOPSY SINGLE/MULTIPLE: CPT | Performed by: INTERNAL MEDICINE

## 2019-07-12 PROCEDURE — 88305 TISSUE EXAM BY PATHOLOGIST: CPT | Performed by: INTERNAL MEDICINE

## 2019-07-12 PROCEDURE — 43450 DILATE ESOPHAGUS 1/MULT PASS: CPT | Performed by: INTERNAL MEDICINE

## 2019-07-12 PROCEDURE — 25010000002 PROPOFOL 10 MG/ML EMULSION: Performed by: ANESTHESIOLOGY

## 2019-07-12 PROCEDURE — 88342 IMHCHEM/IMCYTCHM 1ST ANTB: CPT | Performed by: INTERNAL MEDICINE

## 2019-07-12 RX ORDER — SODIUM CHLORIDE, SODIUM LACTATE, POTASSIUM CHLORIDE, CALCIUM CHLORIDE 600; 310; 30; 20 MG/100ML; MG/100ML; MG/100ML; MG/100ML
30 INJECTION, SOLUTION INTRAVENOUS CONTINUOUS
Status: DISCONTINUED | OUTPATIENT
Start: 2019-07-12 | End: 2019-07-12 | Stop reason: HOSPADM

## 2019-07-12 RX ORDER — LIDOCAINE HYDROCHLORIDE 20 MG/ML
INJECTION, SOLUTION INFILTRATION; PERINEURAL AS NEEDED
Status: DISCONTINUED | OUTPATIENT
Start: 2019-07-12 | End: 2019-07-12 | Stop reason: SURG

## 2019-07-12 RX ORDER — PROPOFOL 10 MG/ML
VIAL (ML) INTRAVENOUS AS NEEDED
Status: DISCONTINUED | OUTPATIENT
Start: 2019-07-12 | End: 2019-07-12 | Stop reason: SURG

## 2019-07-12 RX ADMIN — PROPOFOL 50 MG: 10 INJECTION, EMULSION INTRAVENOUS at 09:20

## 2019-07-12 RX ADMIN — SODIUM CHLORIDE, POTASSIUM CHLORIDE, SODIUM LACTATE AND CALCIUM CHLORIDE 30 ML/HR: 600; 310; 30; 20 INJECTION, SOLUTION INTRAVENOUS at 09:02

## 2019-07-12 RX ADMIN — PROPOFOL 50 MG: 10 INJECTION, EMULSION INTRAVENOUS at 09:18

## 2019-07-12 RX ADMIN — PROPOFOL 50 MG: 10 INJECTION, EMULSION INTRAVENOUS at 09:23

## 2019-07-12 RX ADMIN — LIDOCAINE HYDROCHLORIDE 40 MG: 20 INJECTION, SOLUTION INFILTRATION; PERINEURAL at 09:18

## 2019-07-12 NOTE — ANESTHESIA POSTPROCEDURE EVALUATION
"Patient: Sera Xavier    Procedure Summary     Date:  07/12/19 Room / Location:   [facility]    Anesthesia Start:  0912 Anesthesia Stop:  0933    Procedure:  ESOPHAGOGASTRODUODENOSCOPY WITH COLD BIOPSIES WITH 44 FR ABRAMS DILATATION (N/A Esophagus) Diagnosis:       Gastroesophageal reflux disease, esophagitis presence not specified      Esophageal dysphagia      (Gastroesophageal reflux disease, esophagitis presence not specified [K21.9])      (Esophageal dysphagia [R13.10])    Surgeon:  Blade Olmedo MD Provider:  Devendra Prabhakar MD    Anesthesia Type:  MAC ASA Status:  2          Anesthesia Type: MAC  Last vitals  BP   109/70 (07/12/19 0839)   Temp   37 °C (98.6 °F) (07/12/19 0839)   Pulse   77 (07/12/19 0839)   Resp   20 (07/12/19 0839)     SpO2   100 % (07/12/19 0839)     Post Anesthesia Care and Evaluation    Patient location during evaluation: bedside  Patient participation: complete - patient participated  Level of consciousness: awake  Pain score: 2  Pain management: adequate  Airway patency: patent  Anesthetic complications: No anesthetic complications  PONV Status: none  Cardiovascular status: acceptable  Respiratory status: acceptable  Hydration status: acceptable    Comments: BP (P) 117/79   Pulse (P) 74   Temp 37 °C (98.6 °F) (Oral)   Resp (P) 16   Ht 171.5 cm (67.5\")   Wt 70.4 kg (155 lb 3 oz)   SpO2 (P) 100%   BMI 23.95 kg/m²         "

## 2019-07-12 NOTE — ANESTHESIA PREPROCEDURE EVALUATION
Anesthesia Evaluation     Patient summary reviewed and Nursing notes reviewed   NPO Solid Status: > 8 hours  NPO Liquid Status: > 2 hours           Airway   Mallampati: II  TM distance: >3 FB  Neck ROM: full  Dental - normal exam     Pulmonary - negative pulmonary ROS and normal exam   Cardiovascular - normal exam    (+) valvular problems/murmurs murmur,       Neuro/Psych- negative ROS  GI/Hepatic/Renal/Endo    (+)  GERD,      Musculoskeletal (-) negative ROS    Abdominal    Substance History - negative use     OB/GYN negative ob/gyn ROS         Other                        Anesthesia Plan    ASA 2     MAC     Anesthetic plan, all risks, benefits, and alternatives have been provided, discussed and informed consent has been obtained with: patient.

## 2019-07-16 ENCOUNTER — TELEPHONE (OUTPATIENT)
Dept: GASTROENTEROLOGY | Facility: CLINIC | Age: 39
End: 2019-07-16

## 2019-07-16 LAB
CYTO UR: NORMAL
LAB AP CASE REPORT: NORMAL
PATH REPORT.FINAL DX SPEC: NORMAL
PATH REPORT.GROSS SPEC: NORMAL

## 2019-08-12 ENCOUNTER — OFFICE VISIT (OUTPATIENT)
Dept: GASTROENTEROLOGY | Facility: CLINIC | Age: 39
End: 2019-08-12

## 2019-08-12 VITALS — HEART RATE: 78 BPM | SYSTOLIC BLOOD PRESSURE: 115 MMHG | DIASTOLIC BLOOD PRESSURE: 74 MMHG

## 2019-08-12 DIAGNOSIS — K21.9 GASTROESOPHAGEAL REFLUX DISEASE, ESOPHAGITIS PRESENCE NOT SPECIFIED: Primary | ICD-10-CM

## 2019-08-12 PROCEDURE — 99213 OFFICE O/P EST LOW 20 MIN: CPT | Performed by: INTERNAL MEDICINE

## 2019-08-12 NOTE — PROGRESS NOTES
Subjective   Sera Xavier is a 39 y.o.. female is here today for follow-up.    Chief Complaint   Patient presents with   • Difficulty Swallowing     EGD FU     History of Present Illness  Patient recently underwent EGD, esophageal biopsies and dilation for dysphasia and heartburn.  The examination appeared to be highly suspicious for eosinophilic esophagitis, and multiple proximal and distal biopsies were obtained.  She was treated with omeprazole 20 mg daily and she says that she is doing much better with paucity of GI symptoms at this time.  Interestingly, the biopsies did not demonstrate evidence of eosinophilic esophagitis.    The following portions of the patient's history were reviewed and updated as appropriate: allergies, current medications, past family history, past medical history, past social history, past surgical history and problem list.      Current Outpatient Medications:   •  acyclovir (ZOVIRAX) 400 MG tablet, Take 1 tablet by mouth 3 (Three) Times a Day. Take no more than 5 doses a day., Disp: 30 tablet, Rfl: 2  •  Ascorbic Acid (VITAMIN C ER PO), Take  by mouth., Disp: , Rfl:   •  BIOTIN PO, Take  by mouth., Disp: , Rfl:   •  cetirizine (zyrTEC) 10 MG tablet, Take 10 mg by mouth Daily., Disp: , Rfl:   •  COLLAGEN PO, Take  by mouth., Disp: , Rfl:   •  Cyanocobalamin (VITAMIN B12 PO), Take  by mouth., Disp: , Rfl:   •  fluticasone (FLONASE) 50 MCG/ACT nasal spray, 2 sprays into the nostril(s) as directed by provider Daily., Disp: , Rfl:   •  GLUCOSAMINE-CHONDROITIN PO, Take  by mouth., Disp: , Rfl:   •  levocetirizine (XYZAL) 5 MG tablet, Take 5 mg by mouth Every Evening., Disp: , Rfl:   •  Methylsulfonylmethane (MSM PO), Take  by mouth., Disp: , Rfl:   •  Omega-3 Fatty Acids (FISH OIL) 1000 MG capsule capsule, Take  by mouth Daily With Breakfast., Disp: , Rfl:   •  omeprazole OTC (PRILOSEC OTC) 20 MG EC tablet, Take 1 tablet by mouth Daily., Disp: , Rfl:     Family History   Problem Relation  Age of Onset   • Alcohol abuse Maternal Aunt    • Cancer Maternal Aunt    • Colon cancer Maternal Aunt    • Liver cancer Maternal Aunt    • Asthma Paternal Grandmother    • Diabetes Paternal Grandmother    • Heart attack Paternal Grandmother    • Heart failure Paternal Grandmother    • Hypertension Paternal Grandmother    • Alcohol abuse Paternal Grandfather    • Hyperthyroidism Mother    • Colon polyps Mother    • No Known Problems Father        Review of Systems   Respiratory: Negative for shortness of breath.    Cardiovascular: Negative for chest pain.   All other systems reviewed and are negative.      Objective   Physical Exam   Constitutional: She is oriented to person, place, and time. She appears well-developed and well-nourished.   HENT:   Head: Normocephalic and atraumatic.   Right Ear: External ear normal.   Left Ear: External ear normal.   Eyes: Conjunctivae and EOM are normal. Pupils are equal, round, and reactive to light.   Pulmonary/Chest: Effort normal.   Neurological: She is alert and oriented to person, place, and time.   Psychiatric: She has a normal mood and affect. Her behavior is normal. Judgment and thought content normal.   Nursing note and vitals reviewed.      Pertinent laboratory results were reviewed.  and Pertinent old records were reviewed.     Assessment/Plan   Problems Addressed this Visit        Digestive    Gastroesophageal reflux disease - Primary        I am surprised that the biopsies were negative for eosinophilic esophagitis.  In any case the patient has responded well symptomatically to modest acid suppression and I would recommend that she continue this long-term and have her renal chemistries checked perhaps 2 or 3 times a year.  She is to return should her symptoms worsen or fail to respond to PPI therapy.

## 2020-03-17 DIAGNOSIS — B00.1 RECURRENT COLD SORES: ICD-10-CM

## 2020-03-17 RX ORDER — ACYCLOVIR 400 MG/1
TABLET ORAL
Qty: 30 TABLET | Refills: 2 | Status: SHIPPED | OUTPATIENT
Start: 2020-03-17 | End: 2020-10-26

## 2020-03-18 DIAGNOSIS — Z00.00 PREVENTATIVE HEALTH CARE: Primary | ICD-10-CM

## 2020-03-19 ENCOUNTER — RESULTS ENCOUNTER (OUTPATIENT)
Dept: INTERNAL MEDICINE | Age: 40
End: 2020-03-19

## 2020-03-19 DIAGNOSIS — Z00.00 PREVENTATIVE HEALTH CARE: ICD-10-CM

## 2020-04-01 ENCOUNTER — TELEPHONE (OUTPATIENT)
Dept: INTERNAL MEDICINE | Age: 40
End: 2020-04-01

## 2020-04-01 NOTE — TELEPHONE ENCOUNTER
LVM FOR PT TO CALL AND GET LAB AND PHYSICAL FOR April RESCHEDULED TO 3-4 MONTH OUT. HUB OK TO SCHEDULE.

## 2020-04-13 ENCOUNTER — TELEPHONE (OUTPATIENT)
Dept: INTERNAL MEDICINE | Age: 40
End: 2020-04-13

## 2020-10-26 DIAGNOSIS — B00.1 RECURRENT COLD SORES: ICD-10-CM

## 2020-10-26 RX ORDER — ACYCLOVIR 400 MG/1
TABLET ORAL
Qty: 30 TABLET | Refills: 2 | Status: SHIPPED | OUTPATIENT
Start: 2020-10-26 | End: 2021-01-21

## 2020-11-06 ENCOUNTER — OFFICE VISIT (OUTPATIENT)
Dept: INTERNAL MEDICINE | Age: 40
End: 2020-11-06

## 2020-11-06 VITALS
BODY MASS INDEX: 24.4 KG/M2 | SYSTOLIC BLOOD PRESSURE: 118 MMHG | OXYGEN SATURATION: 100 % | DIASTOLIC BLOOD PRESSURE: 66 MMHG | HEIGHT: 68 IN | RESPIRATION RATE: 16 BRPM | HEART RATE: 99 BPM | WEIGHT: 161 LBS | TEMPERATURE: 97.1 F

## 2020-11-06 DIAGNOSIS — B00.1 RECURRENT COLD SORES: ICD-10-CM

## 2020-11-06 DIAGNOSIS — K21.9 GASTROESOPHAGEAL REFLUX DISEASE, UNSPECIFIED WHETHER ESOPHAGITIS PRESENT: Primary | ICD-10-CM

## 2020-11-06 DIAGNOSIS — R17 ELEVATED BILIRUBIN: ICD-10-CM

## 2020-11-06 DIAGNOSIS — Z79.899 ENCOUNTER FOR MONITORING PROTON PUMP INHIBITOR THERAPY: ICD-10-CM

## 2020-11-06 DIAGNOSIS — Z51.81 ENCOUNTER FOR MONITORING PROTON PUMP INHIBITOR THERAPY: ICD-10-CM

## 2020-11-06 DIAGNOSIS — J30.9 ALLERGIC RHINITIS, UNSPECIFIED SEASONALITY, UNSPECIFIED TRIGGER: ICD-10-CM

## 2020-11-06 LAB
ALBUMIN SERPL-MCNC: 4.6 G/DL (ref 3.5–5.2)
ALBUMIN/GLOB SERPL: 2.3 G/DL
ALP SERPL-CCNC: 67 U/L (ref 39–117)
ALT SERPL-CCNC: 23 U/L (ref 1–33)
AST SERPL-CCNC: 15 U/L (ref 1–32)
BILIRUB SERPL-MCNC: 0.6 MG/DL (ref 0–1.2)
BUN SERPL-MCNC: 12 MG/DL (ref 6–20)
BUN/CREAT SERPL: 13.5 (ref 7–25)
CALCIUM SERPL-MCNC: 9.3 MG/DL (ref 8.6–10.5)
CHLORIDE SERPL-SCNC: 102 MMOL/L (ref 98–107)
CO2 SERPL-SCNC: 27 MMOL/L (ref 22–29)
CREAT SERPL-MCNC: 0.89 MG/DL (ref 0.57–1)
GLOBULIN SER CALC-MCNC: 2 GM/DL
GLUCOSE SERPL-MCNC: 65 MG/DL (ref 65–99)
MAGNESIUM SERPL-MCNC: 2 MG/DL (ref 1.6–2.6)
POTASSIUM SERPL-SCNC: 4.2 MMOL/L (ref 3.5–5.2)
PROT SERPL-MCNC: 6.6 G/DL (ref 6–8.5)
SODIUM SERPL-SCNC: 137 MMOL/L (ref 136–145)

## 2020-11-06 PROCEDURE — 99214 OFFICE O/P EST MOD 30 MIN: CPT | Performed by: NURSE PRACTITIONER

## 2020-11-06 RX ORDER — TRIAMCINOLONE ACETONIDE 55 UG/1
2 SPRAY, METERED NASAL DAILY
COMMUNITY

## 2020-11-06 NOTE — PROGRESS NOTES
[facility] [place] [facility]  Michelle Xavier / 40 y.o. / female  11/06/2020      ASSESSMENT & PLAN:    Problem List Items Addressed This Visit        Digestive    Gastroesophageal reflux disease - Primary    Relevant Medications    omeprazole OTC (PRILOSEC OTC) 20 MG EC tablet    Other Relevant Orders    Comprehensive Metabolic Panel    Magnesium    Ambulatory Referral to Gastroenterology       Other    Recurrent cold sores    Relevant Medications    acyclovir (ZOVIRAX) 400 MG tablet      Other Visit Diagnoses     Elevated bilirubin        Relevant Orders    Comprehensive Metabolic Panel    Encounter for monitoring proton pump inhibitor therapy        Relevant Orders    Comprehensive Metabolic Panel    Magnesium    Allergic rhinitis, unspecified seasonality, unspecified trigger        Relevant Medications    Triamcinolone Acetonide (NASACORT) 55 MCG/ACT nasal inhaler        Orders Placed This Encounter   Procedures   • Comprehensive Metabolic Panel   • Magnesium   • Ambulatory Referral to Gastroenterology     No orders of the defined types were placed in this encounter.      Summary/Discussion:    1. Gastroesophageal reflux disease, unspecified whether esophagitis present  She appears to be rather anxious about possible long-term side effects of medication.  We discussed in detail potential for kidney dysfunction, B12 deficiency, and magnesium depletion.  Will check labs today, advised patient to establish care with new gastroenterologist to discuss whether or not it would be appropriate for her to come off of this PPI.  Continue magnesium and B12 supplementation at this time.     - Comprehensive Metabolic Panel  - Magnesium  - Ambulatory Referral to Gastroenterology    2. Recurrent cold sores    3. Elevated bilirubin    - Comprehensive Metabolic Panel    4. Encounter for monitoring proton pump inhibitor therapy    - Comprehensive Metabolic Panel  - Magnesium    5. Allergic rhinitis, unspecified  "seasonality, unspecified trigger  Refill today.     - Triamcinolone Acetonide (NASACORT) 55 MCG/ACT nasal inhaler; 2 sprays into the nostril(s) as directed by provider Daily.        Return in about 1 year (around 11/6/2021) for Annual physical with fasting labs 1 week .    ____________________________________________________________________    MEDICATIONS  Current Outpatient Medications   Medication Sig Dispense Refill   • acyclovir (ZOVIRAX) 400 MG tablet TAKE 1 TABLET BY MOUTH THREE TIMES DAILY TAKE NO MORE THAN 5 DOSES PER DAY (Patient taking differently: Take 400 mg by mouth 3 (Three) Times a Day As Needed.) 30 tablet 2   • Ascorbic Acid (VITAMIN C ER PO) Take  by mouth.     • cetirizine (zyrTEC) 10 MG tablet Take 10 mg by mouth Daily.     • COLLAGEN PO Take  by mouth.     • Cyanocobalamin (VITAMIN B12 PO) Take  by mouth.     • GLUCOSAMINE-CHONDROITIN PO Take  by mouth.     • levocetirizine (XYZAL) 5 MG tablet Take 5 mg by mouth Every Evening.     • Methylsulfonylmethane (MSM PO) Take  by mouth.     • omeprazole OTC (PRILOSEC OTC) 20 MG EC tablet Take 1 tablet by mouth Daily.     • Triamcinolone Acetonide (NASACORT) 55 MCG/ACT nasal inhaler 2 sprays into the nostril(s) as directed by provider Daily.     • BIOTIN PO Take  by mouth.     • Omega-3 Fatty Acids (FISH OIL) 1000 MG capsule capsule Take  by mouth Daily With Breakfast.       No current facility-administered medications for this visit.           VITALS:    Visit Vitals  /66   Pulse 99   Temp 97.1 °F (36.2 °C)   Resp 16   Ht 171.5 cm (67.5\")   Wt 73 kg (161 lb)   SpO2 100%   BMI 24.84 kg/m²       BP Readings from Last 3 Encounters:   11/06/20 118/66   08/12/19 115/74   07/12/19 116/78     Wt Readings from Last 3 Encounters:   11/06/20 73 kg (161 lb)   07/12/19 70.4 kg (155 lb 3 oz)   06/27/19 68.5 kg (151 lb)      Body mass index is 24.84 kg/m².    CC:  Main reason(s) for today's visit: Mouth Lesions (need medication refill)      HPI:     Patient " here for regular medical follow-up.  She currently takes acyclovir as needed for oral herpes outbreaks.    Patient is also on long-term omeprazole as prescribed by gastroenterology last year after EGD, esophageal biopsies and dilation for dysphasia and heartburn. She is not having any reflux issues at this time.     She has some concerns about the possible long term SE of this medication.     She also reports that her mother had previous history of some type of bilirubin issue.     Patient Care Team:  Michelle Lewis APRN as PCP - General (Internal Medicine)  John Sharif MD (Inactive) as PCP - Family Medicine    ____________________________________________________________________    REVIEW OF SYSTEMS    Review of Systems   Constitutional: Negative for activity change, appetite change and unexpected weight change.   HENT: Negative for tinnitus.    Eyes: Negative for visual disturbance.   Respiratory: Negative for cough, chest tightness and shortness of breath.    Cardiovascular: Negative for chest pain, palpitations and leg swelling.   Neurological: Negative for dizziness, light-headedness and headaches.         PHYSICAL EXAMINATION    Physical Exam  Vitals signs and nursing note reviewed.   Constitutional:       General: She is not in acute distress.     Appearance: She is well-developed. She is not ill-appearing.   Cardiovascular:      Rate and Rhythm: Normal rate and regular rhythm.      Heart sounds: Normal heart sounds, S1 normal and S2 normal. No murmur.   Pulmonary:      Effort: Pulmonary effort is normal.      Breath sounds: Normal breath sounds. No decreased breath sounds, wheezing, rhonchi or rales.   Skin:     General: Skin is warm and dry.   Neurological:      Mental Status: She is alert and oriented to person, place, and time.   Psychiatric:         Mood and Affect: Mood is anxious.         Speech: Speech normal.         Behavior: Behavior normal. Behavior is cooperative.         Thought Content:  Thought content normal.         Judgment: Judgment normal.         REVIEWED DATA:    Labs:     Lab Results   Component Value Date     04/18/2019    K 4.5 04/18/2019    AST 14 04/18/2019    ALT 19 04/18/2019    BUN 15 04/18/2019    CREATININE 0.89 04/18/2019    EGFRIFNONA 70 04/18/2019    EGFRIFAFRI 85 04/18/2019       No results found for: HGBA1C, GLUCOSE, MICROALBUR    No results found for: LDL, HDL, TRIG, CHOLHDLRATIO    Lab Results   Component Value Date    TSH 1.470 04/18/2019       Lab Results   Component Value Date    WBC 3.32 (L) 04/18/2019    HGB 13.1 04/18/2019    HGB 11.3 (L) 10/10/2014     04/18/2019       No results found for: PROTEIN, GLUCOSEU, BLOODU, NITRITEU, LEUKOCYTESUR    Imaging:         Medical Tests:         Summary of old records / correspondence / consultant report:         Request outside records:         ALLERGIES  Allergies   Allergen Reactions   • Amoxicillin Hives   • Penicillins Hives   • Sulfa Antibiotics Paresthesia   • Sulfamethoxazole-Trimethoprim Paresthesia        PFSH:     The following portions of the patient's history were reviewed and updated as appropriate: Allergies / Current Medications / Past Medical History / Surgical History / Social History / Family History    PROBLEM LIST   Patient Active Problem List   Diagnosis   • Vascular headache   • Recurrent cold sores   • Gastroesophageal reflux disease   • Esophageal dysphagia       PAST MEDICAL HISTORY  Past Medical History:   Diagnosis Date   • Cyst of breast    • H/O cold sores    • Heart murmur    • Recurrent sinusitis    • Seasonal allergies        SURGICAL HISTORY  Past Surgical History:   Procedure Laterality Date   • COSMETIC SURGERY      removal of cranial cysts   • ENDOSCOPY N/A 7/12/2019    Procedure: ESOPHAGOGASTRODUODENOSCOPY WITH COLD BIOPSIES WITH 44 FR ABRAMS DILATATION;  Surgeon: Blade Olmedo MD;  Location: [facility];  Service: Gastroenterology   • HYSTERECTOMY      partial lap  hysterectomy; no cervix        SOCIAL HISTORY  Social History     Socioeconomic History   • Marital status: Single     Spouse name: Not on file   • Number of children: 1   • Years of education: Not on file   • Highest education level: Not on file   Occupational History   • Occupation: [employer]   Tobacco Use   • Smoking status: Never Smoker   • Smokeless tobacco: Never Used   • Tobacco comment: socially   Substance and Sexual Activity   • Alcohol use: Yes     Comment: rum once or twice a month   • Drug use: No   • Sexual activity: Defer     Comment: single       FAMILY HISTORY  Family History   Problem Relation Age of Onset   • Alcohol abuse Maternal Aunt    • Cancer Maternal Aunt    • Colon cancer Maternal Aunt    • Liver cancer Maternal Aunt    • Asthma Paternal Grandmother    • Diabetes Paternal Grandmother    • Heart attack Paternal Grandmother    • Heart failure Paternal Grandmother    • Hypertension Paternal Grandmother    • Alcohol abuse Paternal Grandfather    • Hyperthyroidism Mother    • Colon polyps Mother    • Lung cancer Mother    • No Known Problems Father    • Lung cancer Maternal Grandfather          **Lora Disclaimer:   Much of this encounter note is an electronic transcription/translation of spoken language to printed text. The electronic translation of spoken language may permit erroneous, or at times, nonsensical words or phrases to be inadvertently transcribed. Although I have reviewed the note for such errors, some may still exist.

## 2021-01-07 ENCOUNTER — TELEPHONE (OUTPATIENT)
Dept: INTERNAL MEDICINE | Age: 41
End: 2021-01-07

## 2021-01-12 ENCOUNTER — TELEPHONE (OUTPATIENT)
Dept: INTERNAL MEDICINE | Age: 41
End: 2021-01-12

## 2021-01-21 DIAGNOSIS — B00.1 RECURRENT COLD SORES: ICD-10-CM

## 2021-01-21 RX ORDER — ACYCLOVIR 400 MG/1
400 TABLET ORAL 3 TIMES DAILY PRN
Qty: 30 TABLET | Refills: 1 | Status: SHIPPED | OUTPATIENT
Start: 2021-01-21 | End: 2021-05-13

## 2021-01-22 ENCOUNTER — TELEPHONE (OUTPATIENT)
Dept: INTERNAL MEDICINE | Age: 41
End: 2021-01-22

## 2021-01-22 NOTE — TELEPHONE ENCOUNTER
Left a message for the patient to reschedule her 11/8/21 appointment with Michelle Martinez will be out of the office on this date. Ok for hub to reschedule the appointment.

## 2021-02-17 ENCOUNTER — TELEPHONE (OUTPATIENT)
Dept: INTERNAL MEDICINE | Age: 41
End: 2021-02-17

## 2021-02-17 NOTE — TELEPHONE ENCOUNTER
PATIENT IS CONCERNED REGARDING HER APPOINTMENT.  PATIENT DOES NOT WANT TO TAKE A BLOOD TEST THAT CHECKS HER BLOOD SUGAR.  PATIENT CAN TAKE A REGULAR BLOOD TEST.  PATIENT HAS NOT HAD ANY MEDICAL ISSUES.  PLEASE ADVISE    PATIENT CALL BACK #: [phone]

## 2021-03-25 ENCOUNTER — TELEPHONE (OUTPATIENT)
Dept: INTERNAL MEDICINE | Age: 41
End: 2021-03-25

## 2021-03-25 NOTE — TELEPHONE ENCOUNTER
Caller: Sera Xavier    Relationship: Self    Best call back number: [phone]    What orders are you requesting (i.e. lab or imaging): DIAGNOSTIC MAMMOGRAM    In what timeframe would the patient need to come in: ASAP    Where will you receive your lab/imaging services:     Additional notes: WOULD PREFER KIND WITHOUT THE SQUEEZING OF BREASTS    PATIENT HAS AN APPOINTMENT WITH DR. HANNAH FOR 04/05/21, THAT OFFICE SAID THEY WOULD LIKE HER TO HAVE A MAMMOGRAM BEFORE THE APPOINTMENT IF POSSIBLE    PATIENT WOULD LIKE A CALLBACK

## 2021-04-02 ENCOUNTER — OFFICE VISIT (OUTPATIENT)
Dept: INTERNAL MEDICINE | Age: 41
End: 2021-04-02

## 2021-04-02 ENCOUNTER — BULK ORDERING (OUTPATIENT)
Dept: CASE MANAGEMENT | Facility: OTHER | Age: 41
End: 2021-04-02

## 2021-04-02 VITALS
HEART RATE: 87 BPM | OXYGEN SATURATION: 98 % | SYSTOLIC BLOOD PRESSURE: 128 MMHG | WEIGHT: 149.2 LBS | HEIGHT: 68 IN | TEMPERATURE: 97.1 F | BODY MASS INDEX: 22.61 KG/M2 | DIASTOLIC BLOOD PRESSURE: 76 MMHG

## 2021-04-02 DIAGNOSIS — R10.2 PELVIC PAIN: ICD-10-CM

## 2021-04-02 DIAGNOSIS — N76.0 ACUTE VAGINITIS: ICD-10-CM

## 2021-04-02 DIAGNOSIS — R31.9 HEMATURIA, UNSPECIFIED TYPE: Primary | ICD-10-CM

## 2021-04-02 DIAGNOSIS — Z71.1 CONCERN ABOUT STD IN FEMALE WITHOUT DIAGNOSIS: ICD-10-CM

## 2021-04-02 DIAGNOSIS — Z23 IMMUNIZATION DUE: ICD-10-CM

## 2021-04-02 LAB
BILIRUB BLD-MCNC: NEGATIVE MG/DL
CLARITY, POC: CLEAR
COLOR UR: NORMAL
GLUCOSE UR STRIP-MCNC: NEGATIVE MG/DL
KETONES UR QL: NEGATIVE
LEUKOCYTE EST, POC: NEGATIVE
NITRITE UR-MCNC: NEGATIVE MG/ML
PH UR: 6 [PH] (ref 5–8)
PROT UR STRIP-MCNC: NEGATIVE MG/DL
RBC # UR STRIP: NEGATIVE /UL
SP GR UR: 1.02 (ref 1–1.03)
UROBILINOGEN UR QL: NORMAL

## 2021-04-02 PROCEDURE — 81003 URINALYSIS AUTO W/O SCOPE: CPT | Performed by: NURSE PRACTITIONER

## 2021-04-02 PROCEDURE — 99214 OFFICE O/P EST MOD 30 MIN: CPT | Performed by: NURSE PRACTITIONER

## 2021-04-02 RX ORDER — FLUCONAZOLE 150 MG/1
150 TABLET ORAL ONCE
Qty: 1 TABLET | Refills: 0 | Status: SHIPPED | OUTPATIENT
Start: 2021-04-02 | End: 2021-04-02

## 2021-04-02 RX ORDER — PHENAZOPYRIDINE HYDROCHLORIDE 100 MG/1
100 TABLET, FILM COATED ORAL 3 TIMES DAILY PRN
Qty: 10 TABLET | Refills: 0 | Status: SHIPPED | OUTPATIENT
Start: 2021-04-02 | End: 2022-03-31

## 2021-04-02 NOTE — PROGRESS NOTES
"Chief Complaint  Blood in Urine    Subjective          Sera Xavier presents to [facility]  Blood in Urine  This is a new problem. The current episode started yesterday. She describes the hematuria as gross (Only with wiping, urine in toilet did not appear bloody) hematuria. She is experiencing no pain (Had urethral \"spasms\" yesterday, bilateral groin \"burning\" has resolved). She describes her urine color as clear. Irritative symptoms do not include frequency, nocturia or urgency. Associated symptoms include abdominal pain. Pertinent negatives include no flank pain, nausea or vomiting. She is sexually active. There is no history of  trauma, kidney stones, recent infection or STDs.     Patient not completely sure blood came from urine. She is s/p total hysterectomy.   Reports she had unprotected sex about 1 week ago.   No vaginal discharge, pain, etc. She is requesting pelvic exam today to \"see if everything looks okay down there\".     Objective   Vital Signs:   /76   Pulse 87   Temp 97.1 °F (36.2 °C)   Ht 171.5 cm (67.52\")   Wt 67.7 kg (149 lb 3.2 oz)   SpO2 98%   BMI 23.01 kg/m²     Physical Exam  Genitourinary:     General: Normal vulva.      Vagina: Vaginal discharge (white thick discharge) present.      Uterus: Absent.       Comments: Cervix absent   Psychiatric:         Mood and Affect: Mood is anxious.        Result Review :{Labs  Result Review  Imaging  Med Tab  Media :23}   The following data was reviewed by: SOLO Araujo on 04/02/2021:  Common labs    Common Labsle 11/6/20   Glucose 65   BUN 12   Creatinine 0.89   eGFR Non African Am 70   eGFR African Am 84   Sodium 137   Potassium 4.2   Chloride 102   Calcium 9.3   Total Protein 6.6   Albumin 4.60   Total Bilirubin 0.6   Alkaline Phosphatase 67   AST (SGOT) 15   ALT (SGPT) 23                    Assessment and Plan    Diagnoses and all orders for this visit:    1. Hematuria, unspecified type " (Primary)  No evidence of blood on vaginal exam.  No evidence of blood or infection in urinalysis done in lab today.  Advised patient multiple possible causes of acute bleeding, including excessive exercise, kidney stone, hemorrhoids, etc.   Will send UA for lab urinalysis, send testing for STDs.  Wrong swab was used to collect BV panel but I did preemptively treat for yeast as discharge appeared to be candidal.    discussed with patient if testing negative but symptoms persist, may need to consider further testing and/or imaging.     -     POCT urinalysis dipstick, automated  -     Cancel: Chlamydia trachomatis, Neisseria gonorrhoeae, Trichomonas vaginalis, PCR - Swab, Cervix  -     Urinalysis With Culture If Indicated - Urine, Clean Catch  -     phenazopyridine (Pyridium) 100 MG tablet; Take 1 tablet by mouth 3 (Three) Times a Day As Needed for Bladder Spasms.  Dispense: 10 tablet; Refill: 0  -     Cancel: NuSwab BV & Candida - Swab, Vagina    2. Pelvic pain  -     Cancel: Chlamydia trachomatis, Neisseria gonorrhoeae, Trichomonas vaginalis, PCR - Swab, Cervix  -     phenazopyridine (Pyridium) 100 MG tablet; Take 1 tablet by mouth 3 (Three) Times a Day As Needed for Bladder Spasms.  Dispense: 10 tablet; Refill: 0  -     Cancel: NuSwab BV & Candida - Swab, Vagina  -     Chlamydia trachomatis, Neisseria gonorrhoeae, Trichomonas vaginalis, PCR - Urine, Urine, Clean Catch    3. Concern about STD in female without diagnosis  -     Cancel: Chlamydia trachomatis, Neisseria gonorrhoeae, Trichomonas vaginalis, PCR - Swab, Cervix  -     Cancel: NuSwab BV & Candida - Swab, Vagina  -     Chlamydia trachomatis, Neisseria gonorrhoeae, Trichomonas vaginalis, PCR - Urine, Urine, Clean Catch    4. Acute vaginitis  -     fluconazole (Diflucan) 150 MG tablet; Take 1 tablet by mouth 1 (One) Time for 1 dose.  Dispense: 1 tablet; Refill: 0  -     Chlamydia trachomatis, Neisseria gonorrhoeae, Trichomonas vaginalis, PCR - Urine, Urine,  Clean Catch        Follow Up   Return in about 6 months (around 10/2/2021) for Annual physical with fasting labs 1 week prior .  Patient was given instructions and counseling regarding her condition or for health maintenance advice. Please see specific information pulled into the AVS if appropriate.

## 2021-04-03 LAB
APPEARANCE UR: CLEAR
BACTERIA #/AREA URNS HPF: NORMAL /HPF
BILIRUB UR QL STRIP: NEGATIVE
COLOR UR: YELLOW
EPI CELLS #/AREA URNS HPF: NORMAL /HPF (ref 0–10)
GLUCOSE UR QL: NEGATIVE
HGB UR QL STRIP: NEGATIVE
KETONES UR QL STRIP: NEGATIVE
LEUKOCYTE ESTERASE UR QL STRIP: NEGATIVE
MICRO URNS: NORMAL
MICRO URNS: NORMAL
MUCOUS THREADS URNS QL MICRO: PRESENT /HPF
NITRITE UR QL STRIP: NEGATIVE
PH UR STRIP: 6 [PH] (ref 5–7.5)
PROT UR QL STRIP: NEGATIVE
RBC #/AREA URNS HPF: NORMAL /HPF (ref 0–2)
SP GR UR: 1.03 (ref 1–1.03)
URINALYSIS REFLEX: NORMAL
UROBILINOGEN UR STRIP-MCNC: 0.2 MG/DL (ref 0.2–1)
WBC #/AREA URNS HPF: NORMAL /HPF (ref 0–5)

## 2021-04-05 ENCOUNTER — OFFICE VISIT (OUTPATIENT)
Dept: MAMMOGRAPHY | Facility: CLINIC | Age: 41
End: 2021-04-05

## 2021-04-05 DIAGNOSIS — N63.21 MASS OF UPPER OUTER QUADRANT OF LEFT BREAST: Primary | ICD-10-CM

## 2021-04-05 LAB
C TRACH RRNA SPEC QL NAA+PROBE: NEGATIVE
N GONORRHOEA RRNA SPEC QL NAA+PROBE: NEGATIVE
T VAGINALIS DNA SPEC QL NAA+PROBE: NEGATIVE

## 2021-04-05 PROCEDURE — 99204 OFFICE O/P NEW MOD 45 MIN: CPT | Performed by: SURGERY

## 2021-04-05 NOTE — PROGRESS NOTES
Chief Complaint: Srea Xavier is a 41 y.o.. female here today for No chief complaint on file.        History of Present Illness:  Patient presents with breast mass.   She is a nice 41-year-old black female that I saw in 2017 for a left breast mass which on imaging turned out to be a simple cyst.  Over the last month or so the patient has been working out and noticed a little bit of tenderness in the upper outer quadrant of the left breast near the edge of the pectoralis muscle.  She could feel some tissue in this area and wondered whether it was muscle or even something like lymph nodes.  The mild discomfort that she has in this area is very intermittent.  She has not had any imaging studies since 2017.    I did review her reports and studies from 2017.    Her family history is significant for a mother recently diagnosed with lung cancer that ended up eroding into her breast.    Review of Systems:  Review of Systems   Constitutional: Negative.    HENT:  Negative.    Eyes: Negative.    Respiratory: Negative.    Cardiovascular: Negative.    Gastrointestinal: Negative.    Endocrine: Negative.    Genitourinary: Negative.     Musculoskeletal: Negative.    Skin:        The patient denies any noticeable changes to the skin around / on the breast   Neurological: Negative.    Hematological: Negative.       I have reviewed the ROS as documented by the MA/LPN/RN Kenan Maya MD      Past Medical and Surgical History:  Breast Biopsy History:  Patient has not had a breast biopsy in the past.  Breast Cancer HIstory:  Patient does not have a past medical history of breast cancer.  Breast Operations, and year:  None  Social History     Tobacco Use   Smoking Status Never Smoker   Smokeless Tobacco Never Used   Tobacco Comment    socially     Obstetric History:  Patient is postmenopausal due to removal of her uterus in the following year:2019   Number of pregnancies:1  Number of live births: 1  Number of abortions or  miscarriages: 0  Age of delivery of first child: 22  Patient breast fed, for the following lenth of time: 2 WKS  Length of time taking birth control pills:8 years  Patient has never taken hormone replacement    Past Surgical History:   Procedure Laterality Date   • COSMETIC SURGERY      removal of cranial cysts   • ENDOSCOPY N/A 7/12/2019    Procedure: ESOPHAGOGASTRODUODENOSCOPY WITH COLD BIOPSIES WITH 44 FR ABRAMS DILATATION;  Surgeon: Blade Olmedo MD;  Location: [facility] ENDOSCOPY;  Service: Gastroenterology   • HYSTERECTOMY      partial lap hysterectomy; no cervix        Past Medical History:   Diagnosis Date   • Cyst of breast    • H/O cold sores    • Heart murmur    • Recurrent sinusitis    • Seasonal allergies        Prior Hospitalizations, other than for surgery or childbirth, and year:  none    Social History:  Patient is .  Patient has 1 daughters.    Family History:  Family History   Problem Relation Age of Onset   • Alcohol abuse Maternal Aunt    • Cancer Maternal Aunt    • Colon cancer Maternal Aunt    • Liver cancer Maternal Aunt    • Asthma Paternal Grandmother    • Diabetes Paternal Grandmother    • Heart attack Paternal Grandmother    • Heart failure Paternal Grandmother    • Hypertension Paternal Grandmother    • Alcohol abuse Paternal Grandfather    • Hyperthyroidism Mother    • Colon polyps Mother    • Lung cancer Mother    • No Known Problems Father    • Lung cancer Maternal Grandfather        Vital Signs:  There were no vitals filed for this visit.    Medications:    Current Outpatient Prescriptions:     Current Outpatient Medications:   •  acyclovir (ZOVIRAX) 400 MG tablet, Take 1 tablet by mouth 3 (Three) Times a Day As Needed (outbreak)., Disp: 30 tablet, Rfl: 1  •  Ascorbic Acid (VITAMIN C ER PO), Take  by mouth., Disp: , Rfl:   •  BIOTIN PO, Take  by mouth., Disp: , Rfl:   •  cetirizine (zyrTEC) 10 MG tablet, Take 10 mg by mouth Daily., Disp: , Rfl:   •  COLLAGEN PO, Take  by  mouth., Disp: , Rfl:   •  Cyanocobalamin (VITAMIN B12 PO), Take  by mouth., Disp: , Rfl:   •  GLUCOSAMINE-CHONDROITIN PO, Take  by mouth., Disp: , Rfl:   •  levocetirizine (XYZAL) 5 MG tablet, Take 5 mg by mouth Every Evening., Disp: , Rfl:   •  Methylsulfonylmethane (MSM PO), Take  by mouth., Disp: , Rfl:   •  Omega-3 Fatty Acids (FISH OIL) 1000 MG capsule capsule, Take  by mouth Daily With Breakfast., Disp: , Rfl:   •  omeprazole OTC (PRILOSEC OTC) 20 MG EC tablet, Take 1 tablet by mouth Daily., Disp: , Rfl:   •  phenazopyridine (Pyridium) 100 MG tablet, Take 1 tablet by mouth 3 (Three) Times a Day As Needed for Bladder Spasms., Disp: 10 tablet, Rfl: 0  •  Triamcinolone Acetonide (NASACORT) 55 MCG/ACT nasal inhaler, 2 sprays into the nostril(s) as directed by provider Daily., Disp: , Rfl:     Physical Examination:  General Appearance:   Patient is in no distress.  She is well kept and has an average build.   Psychiatric:  Patient with appropriate mood and affect. Alert and oriented to self, time, and place.    Breast, RIGHT:  small sized, symmetric with the contralateral side.  Breast skin is without erythema, edema, rashes.  There are no visible abnormalities upon inspection during the arm-raising maneuver or with hands on hips in the sitting position. There is no nipple retraction, discharge or nipple/areolar skin changes.There are no masses palpable in the sitting or supine positions.    Breast, LEFT:  small sized, symmetric with the contralateral side.  Breast skin is without erythema, edema, rashes.  There are no visible abnormalities upon inspection during the arm-raising maneuver or with hands on hips in the sitting position. There is no nipple retraction, discharge or nipple/areolar skin changes.There are no masses palpable in the sitting or supine positions.  She has some nodular tissue in the upper outer quadrant near the edge of the areola that is smooth and rounded.  In the upper outer quadrant I feel  some nodular tissue in the tail of the breast but none of it is concerning.    Lymphatic:  There is no axillary, cervical, infraclavicular, or supraclavicular adenopathy bilaterally.  Eyes:  Pupils are round and reactive to light.  Cardiovascular:  Heart rate and rhythm are regular.  Respiratory:  Lungs are clear bilaterally with no crackles or wheezes in any lung field.  Gastrointestinal:  Abdomen is soft, nondistended, and nontender.  There was no obvious hepatosplenomegaly or abdominal mass.  There are no scars from previous surgery.    Musculoskeletal:  Good strength in all 4 extremities.   There is good range of motion in both shoulders.    Skin:  No new skin lesions or rashes on the skin excluding the breast (see breast exam above).    Assessment:  No diagnosis found.      Plan:  I think the area that she is feeling represents nodular tissue in the tail of Harjeet.  It has been quite some time that she has had imaging and I would like to obtain some diagnostic mammograms and an ultrasound of this area.  I will call her with those results.      CPT coding:    Next Appointment:  No follow-ups on file.            EMR Dragon/transcription disclaimer:    Much of this encounter note is an electronic transcription/translocation of spoken language to printed text.  The electronic translation of spoken language may permit erroneous, or at times, nonsensical words or phrases to be inadvertently transcribed.  Although I have reviewed the note from such areas, some may still exist.

## 2021-05-07 ENCOUNTER — APPOINTMENT (OUTPATIENT)
Dept: WOMENS IMAGING | Facility: HOSPITAL | Age: 41
End: 2021-05-07

## 2021-05-07 PROCEDURE — 77066 DX MAMMO INCL CAD BI: CPT | Performed by: RADIOLOGY

## 2021-05-07 PROCEDURE — 77062 BREAST TOMOSYNTHESIS BI: CPT | Performed by: RADIOLOGY

## 2021-05-07 PROCEDURE — 76642 ULTRASOUND BREAST LIMITED: CPT | Performed by: RADIOLOGY

## 2021-05-07 PROCEDURE — G0279 TOMOSYNTHESIS, MAMMO: HCPCS | Performed by: RADIOLOGY

## 2021-05-11 ENCOUNTER — TELEPHONE (OUTPATIENT)
Dept: MAMMOGRAPHY | Facility: CLINIC | Age: 41
End: 2021-05-11

## 2021-05-11 NOTE — TELEPHONE ENCOUNTER
[facility] called requesting an order for a left breast cyst aspiration.  I called the pt so I could get this scheduled but the pt is confused, she said she was told it only needed aspiration if it was larger.   However her imaging came back as a BIRADS 4A.  Pt is wanting your opinion prior to scheduling.     CMA

## 2021-05-12 NOTE — TELEPHONE ENCOUNTER
Appointment made for C left breast cyst aspiration on 5/24/2021 arrive at 10:45.  Pt notified. Order placed.     CMA

## 2021-05-13 DIAGNOSIS — B00.1 RECURRENT COLD SORES: ICD-10-CM

## 2021-05-13 RX ORDER — ACYCLOVIR 400 MG/1
TABLET ORAL
Qty: 30 TABLET | Refills: 1 | Status: SHIPPED | OUTPATIENT
Start: 2021-05-13 | End: 2021-07-12

## 2021-05-24 ENCOUNTER — APPOINTMENT (OUTPATIENT)
Dept: WOMENS IMAGING | Facility: HOSPITAL | Age: 41
End: 2021-05-24

## 2021-05-24 PROCEDURE — 76942 ECHO GUIDE FOR BIOPSY: CPT | Performed by: RADIOLOGY

## 2021-05-24 PROCEDURE — 19000 PUNCTURE ASPIR CYST BREAST: CPT | Performed by: RADIOLOGY

## 2021-05-27 ENCOUNTER — TELEPHONE (OUTPATIENT)
Dept: MAMMOGRAPHY | Facility: CLINIC | Age: 41
End: 2021-05-27

## 2021-05-27 NOTE — TELEPHONE ENCOUNTER
I left a message stating that the cyst aspiration was successful and the fluid was not sent because it was clear and the cyst totally resolved.  There is really nothing further to do and I told her she did not really need to call me back.  I just wanted her to know but I have reviewed things.

## 2021-07-12 DIAGNOSIS — B00.1 RECURRENT COLD SORES: ICD-10-CM

## 2021-07-12 RX ORDER — ACYCLOVIR 400 MG/1
TABLET ORAL
Qty: 30 TABLET | Refills: 1 | Status: SHIPPED | OUTPATIENT
Start: 2021-07-12 | End: 2021-10-15

## 2021-10-08 ENCOUNTER — OFFICE VISIT (OUTPATIENT)
Dept: INTERNAL MEDICINE | Age: 41
End: 2021-10-08

## 2021-10-08 VITALS
OXYGEN SATURATION: 95 % | HEIGHT: 68 IN | BODY MASS INDEX: 23.67 KG/M2 | SYSTOLIC BLOOD PRESSURE: 118 MMHG | TEMPERATURE: 97.7 F | HEART RATE: 108 BPM | WEIGHT: 156.2 LBS | DIASTOLIC BLOOD PRESSURE: 70 MMHG

## 2021-10-08 DIAGNOSIS — J01.90 ACUTE SINUSITIS, RECURRENCE NOT SPECIFIED, UNSPECIFIED LOCATION: Primary | ICD-10-CM

## 2021-10-08 PROCEDURE — 99213 OFFICE O/P EST LOW 20 MIN: CPT | Performed by: NURSE PRACTITIONER

## 2021-10-08 RX ORDER — KETOTIFEN FUMARATE 0.35 MG/ML
2 SOLUTION/ DROPS OPHTHALMIC DAILY
COMMUNITY

## 2021-10-08 RX ORDER — GUAIFENESIN, PSEUDOEPHEDRINE HYDROCHLORIDE 600; 60 MG/1; MG/1
1 TABLET, EXTENDED RELEASE ORAL EVERY 12 HOURS
COMMUNITY

## 2021-10-08 RX ORDER — FLUCONAZOLE 150 MG/1
150 TABLET ORAL ONCE
Qty: 1 TABLET | Refills: 0 | Status: SHIPPED | OUTPATIENT
Start: 2021-10-08 | End: 2021-10-08

## 2021-10-08 RX ORDER — MOXIFLOXACIN HYDROCHLORIDE 400 MG/1
400 TABLET ORAL DAILY
Qty: 10 TABLET | Refills: 0 | Status: SHIPPED | OUTPATIENT
Start: 2021-10-08 | End: 2021-10-18

## 2021-10-08 NOTE — PROGRESS NOTES
"    I N T E R N A L  M E D I C I N E  CARLOS MUÑOZ, APRN      ENCOUNTER DATE:  10/08/2021    Sera Xavier / 41 y.o. / female      CHIEF COMPLAINT / REASON FOR OFFICE VISIT     Sinus Problem (x1.5 wks, pressure in cheeks/ eyes, nasal drainage is yellow, cough is productive and yellow)      ASSESSMENT & PLAN     1. Acute sinusitis, recurrence not specified, unspecified location  -Avelox 400 mg daily for 10 days  -Continue Xyzal  -Continue Nasacort    No orders of the defined types were placed in this encounter.    New Medications Ordered This Visit   Medications   • moxifloxacin (Avelox) 400 MG tablet     Sig: Take 1 tablet by mouth Daily for 10 days.     Dispense:  10 tablet     Refill:  0   • fluconazole (Diflucan) 150 MG tablet     Sig: Take 1 tablet by mouth 1 (One) Time for 1 dose. One tablet now and one in 72 hours     Dispense:  1 tablet     Refill:  0       SUMMARY/DISCUSSION  • Follow-up if symptoms do not improve or worsen    Next Appointment with me: Visit date not found    No follow-ups on file.      VITAL SIGNS     Visit Vitals  /70 (Cuff Size: Adult)   Pulse 108   Temp 97.7 °F (36.5 °C) (Temporal)   Ht 171.5 cm (67.52\")   Wt 70.9 kg (156 lb 3.2 oz)   SpO2 95%   BMI 24.09 kg/m²     Wt Readings from Last 3 Encounters:   10/08/21 70.9 kg (156 lb 3.2 oz)   04/02/21 67.7 kg (149 lb 3.2 oz)   11/06/20 73 kg (161 lb)     Body mass index is 24.09 kg/m².      MEDICATIONS AT THE TIME OF OFFICE VISIT     Current Outpatient Medications on File Prior to Visit   Medication Sig   • acyclovir (ZOVIRAX) 400 MG tablet TAKE 1 TABLET BY MOUTH THREE TIMES DAILY AS NEEDED FOR OUTBREAK   • Ascorbic Acid (VITAMIN C ER PO) Take  by mouth.   • cetirizine (zyrTEC) 10 MG tablet Take 10 mg by mouth Daily.   • COLLAGEN PO Take  by mouth.   • Cyanocobalamin (VITAMIN B12 PO) Take  by mouth.   • GLUCOSAMINE-CHONDROITIN PO Take  by mouth.   • ketotifen (ZADITOR) 0.025 % ophthalmic solution Administer 2 drops to both eyes Daily. "   • levocetirizine (XYZAL) 5 MG tablet Take 5 mg by mouth Every Evening.   • Methylsulfonylmethane (MSM PO) Take  by mouth.   • Omega-3 Fatty Acids (FISH OIL) 1000 MG capsule capsule Take  by mouth Daily With Breakfast.   • omeprazole OTC (PRILOSEC OTC) 20 MG EC tablet Take 1 tablet by mouth Daily.   • pseudoephedrine-guaifenesin (MUCINEX D)  MG per 12 hr tablet Take 1 tablet by mouth Every 12 (Twelve) Hours.   • Triamcinolone Acetonide (NASACORT) 55 MCG/ACT nasal inhaler 2 sprays into the nostril(s) as directed by provider Daily.   • BIOTIN PO Take  by mouth.   • phenazopyridine (Pyridium) 100 MG tablet Take 1 tablet by mouth 3 (Three) Times a Day As Needed for Bladder Spasms.     No current facility-administered medications on file prior to visit.         HISTORY OF PRESENT ILLNESS     Patient presents with symptoms of nasal congestion, sinus pressure and pain, nasal congestion, postnasal drip and cough as a result.  Significant history of allergy.  She has been followed by an allergist in the past with only successful treatment of acute sinusitis with Avelox.  No shortness of breath, no GI symptoms.  No fever, chills or muscle or body aches.  Works from home.    REVIEW OF SYSTEMS     Constitutional neg except per HPI   ENT nasal congestion, postnasal drip  Resp   CV neg    PHYSICAL EXAMINATION     Physical Exam  Constitutional  No distress  ENT nasal congestion; tenderness with palpation to sinus  Cardiovascular Rate  normal . Rhythm: regular . Heart sounds:  normal  Pulmonary/Chest  Effort normal. Breath sounds:  normal  Psychiatric  Alert. Judgment and thought content normal. Mood normal       REVIEWED DATA     Labs:   Lab Results   Component Value Date    BUN 12 11/06/2020    CREATININE 0.89 11/06/2020    EGFRIFNONA 70 11/06/2020    EGFRIFAFRI 84 11/06/2020    BCR 13.5 11/06/2020    K 4.2 11/06/2020    CO2 27.0 11/06/2020    CALCIUM 9.3 11/06/2020    PROTENTOTREF 6.6 11/06/2020    ALBUMIN 4.60 11/06/2020     LABIL2 2.3 11/06/2020    AST 15 11/06/2020    ALT 23 11/06/2020         Imaging:           Medical Tests:             Summary of old records / correspondence / consultant report:           Request outside records:           *Examiner was wearing medical surgical mask, face shield and exam gloves during the entire duration of the visit. Patient was masked the entire time.   Minimum social distance of 6 ft maintained entire visit except if physical contact was necessary as documented.     **Dragon Disclaimer:   Much of this encounter note is an electronic transcription/translation of spoken language to printed text. The electronic translation of spoken language may permit erroneous, or at times, nonsensical words or phrases to be inadvertently transcribed. Although I have reviewed the note for such errors, some may still exist.

## 2021-10-15 DIAGNOSIS — B00.1 RECURRENT COLD SORES: ICD-10-CM

## 2021-10-15 RX ORDER — ACYCLOVIR 400 MG/1
TABLET ORAL
Qty: 30 TABLET | Refills: 1 | Status: SHIPPED | OUTPATIENT
Start: 2021-10-15 | End: 2021-12-28

## 2021-10-18 ENCOUNTER — TELEPHONE (OUTPATIENT)
Dept: INTERNAL MEDICINE | Age: 41
End: 2021-10-18

## 2021-10-18 DIAGNOSIS — J01.90 ACUTE SINUSITIS, RECURRENCE NOT SPECIFIED, UNSPECIFIED LOCATION: Primary | ICD-10-CM

## 2021-10-18 RX ORDER — METHYLPREDNISOLONE 4 MG/1
TABLET ORAL
Qty: 1 EACH | Refills: 0 | Status: SHIPPED | OUTPATIENT
Start: 2021-10-18 | End: 2022-03-31

## 2021-10-18 NOTE — TELEPHONE ENCOUNTER
PATIENT STATES SHE WAS SEEN BY CARLOS MUÑOZ ON 10/08 FOR A VERY BAD SINUS INFECTION SHE WAS PRESCRIBED moxifloxacin (Avelox) 400 MG tablet FOR 10 DAYS.. TODAY IS HER LAST DAY. SHE IS FEELING MUCH MUCH MUCH BETTER BUT STILL HAS FLUID IN HER EAR AND PAIN ON THE RIGHT SIDE OF HER FACE SHE IS REQUESTING US TO CALL IN MAYBE AN ADDITIONAL 3-5 DAYS OF THIS MEDICATION JUST TO COMPLETELY KNOCK IT OUT..       [phone]    [id number] - [facility] [address] AT [facility] & [facility] - [phone] [facility] [phone] FX

## 2021-10-18 NOTE — TELEPHONE ENCOUNTER
Contact patient.     10 days of moxifloxacin is more than recommended 7 days' duration, so I would think it would be unlikely that you would continue to have infection.     I would recommend treatment with a course of steroids to help with inflammation. If you continue to have sinus symptoms, we may need to consider either referral to ENT specialist or obtaining a CT of the sinuses to see if there is something structural causing these issues.

## 2021-10-21 DIAGNOSIS — Z00.00 PREVENTATIVE HEALTH CARE: Primary | ICD-10-CM

## 2021-10-26 ENCOUNTER — TELEPHONE (OUTPATIENT)
Dept: INTERNAL MEDICINE | Age: 41
End: 2021-10-26

## 2021-10-26 DIAGNOSIS — J32.9 RECURRENT SINUSITIS: Primary | ICD-10-CM

## 2021-10-26 RX ORDER — MOXIFLOXACIN HYDROCHLORIDE 400 MG/1
400 TABLET ORAL DAILY
Qty: 5 TABLET | Refills: 0 | Status: SHIPPED | OUTPATIENT
Start: 2021-10-26 | End: 2021-10-31

## 2021-10-26 NOTE — TELEPHONE ENCOUNTER
Contact patient.     I have sent in 5 days worth moxifloxacin.     However, if she is having recurrent sinus issues that is not responding to antibiotic (as this is a VERY strong antibiotic), she really needs to be seeing an ear, nose, throat specialist.     Does she see one now or do I need to place a referral?

## 2021-10-26 NOTE — TELEPHONE ENCOUNTER
Caller: Sera Xavier    Relationship: Self    Best call back number: [phone]    What medication are you requesting: moxifloxacin (Avelox) 400 MG tablet    What are your current symptoms: DRAINAGE, SORE THROAT    How long have you been experiencing symptoms: ABOUT A WEEK    Have you had these symptoms before:    [x] Yes  [] No    Have you been treated for these symptoms before:   [x] Yes  [] No    If a prescription is needed, what is your preferred pharmacy and phone number: [facility] #[id number] [address] AT [facility] & [facility] - [phone] [facility] [phone]     Additional notes: PATIENT FINISHED FIRST PRESCRIPTION ON 10/18/21, STATES SHE CAN FEEL THE INFECTION COMING BACK AND BELIEVES AN EXTRA ROUND OF ANTIBIOTICS WILL WORK.    PLEASE ADVISE PATIENT

## 2021-12-28 DIAGNOSIS — B00.1 RECURRENT COLD SORES: ICD-10-CM

## 2021-12-28 RX ORDER — ACYCLOVIR 400 MG/1
TABLET ORAL
Qty: 30 TABLET | Refills: 1 | Status: SHIPPED | OUTPATIENT
Start: 2021-12-28 | End: 2022-03-31 | Stop reason: SDUPTHER

## 2021-12-28 NOTE — TELEPHONE ENCOUNTER
Rx Refill Note  Requested Prescriptions     Pending Prescriptions Disp Refills   • acyclovir (ZOVIRAX) 400 MG tablet [Pharmacy Med Name: ACYCLOVIR 400MG TABLETS] 30 tablet 1     Sig: TAKE 1 TABLET BY MOUTH THREE TIMES DAILY AS NEEDED FOR OUTBREAK      Last office visit with prescribing clinician: 4/2/2021      Next office visit with prescribing clinician: 2/22/2022            Guera Schuler MA  12/28/21, 13:11 EST

## 2022-01-12 ENCOUNTER — TELEPHONE (OUTPATIENT)
Dept: INTERNAL MEDICINE | Age: 42
End: 2022-01-12

## 2022-01-12 NOTE — TELEPHONE ENCOUNTER
LVM FOR PATIENT TO RESCHEDULE [facility] APPT DUE TO PROVIDER LEAVING OFFICE.  OK FOR HUB TO RESCHEDULE

## 2022-03-15 ENCOUNTER — TELEPHONE (OUTPATIENT)
Dept: INTERNAL MEDICINE | Age: 42
End: 2022-03-15

## 2022-03-31 ENCOUNTER — OFFICE VISIT (OUTPATIENT)
Dept: INTERNAL MEDICINE | Age: 42
End: 2022-03-31

## 2022-03-31 VITALS
WEIGHT: 169.8 LBS | SYSTOLIC BLOOD PRESSURE: 126 MMHG | HEIGHT: 67 IN | TEMPERATURE: 97.1 F | DIASTOLIC BLOOD PRESSURE: 72 MMHG | BODY MASS INDEX: 26.65 KG/M2 | HEART RATE: 109 BPM | OXYGEN SATURATION: 99 %

## 2022-03-31 DIAGNOSIS — K21.9 GASTROESOPHAGEAL REFLUX DISEASE, UNSPECIFIED WHETHER ESOPHAGITIS PRESENT: ICD-10-CM

## 2022-03-31 DIAGNOSIS — Z00.00 HEALTH CARE MAINTENANCE: Primary | ICD-10-CM

## 2022-03-31 DIAGNOSIS — Z91.09 ENVIRONMENTAL ALLERGIES: ICD-10-CM

## 2022-03-31 DIAGNOSIS — B00.1 RECURRENT COLD SORES: ICD-10-CM

## 2022-03-31 PROCEDURE — 99214 OFFICE O/P EST MOD 30 MIN: CPT | Performed by: NURSE PRACTITIONER

## 2022-03-31 RX ORDER — ACYCLOVIR 400 MG/1
TABLET ORAL
Qty: 30 TABLET | Refills: 1 | Status: SHIPPED | OUTPATIENT
Start: 2022-03-31 | End: 2022-07-07

## 2022-03-31 NOTE — PROGRESS NOTES
"    I N T E R N A L  M E D I C I N E  CARLOS MUÑOZ, APRN      ENCOUNTER DATE:  2022    Sera Xavier / 41 y.o. / female      CHIEF COMPLAINT / REASON FOR OFFICE VISIT     Establish Care, oral herpes simplex, Allergies, and Heartburn      ASSESSMENT & PLAN     1. Health care maintenance  - CBC & Differential  - Comprehensive Metabolic Panel  - Lipid Panel With / Chol / HDL Ratio  - TSH+Free T4  - Urinalysis With Culture If Indicated - Urine, Clean Catch    2. Recurrent cold sores  - acyclovir (ZOVIRAX) 400 MG tablet; 800 mg 3 times daily for 2 days with oral herpes outbreak  Dispense: 30 tablet; Refill: 1    3. Gastroesophageal reflux disease, unspecified whether esophagitis present  -Continue omeprazole 20 mg daily  - CBC & Differential  - Comprehensive Metabolic Panel    4.  Environmental allergies  -Continue Xyzal/Zyrtec, Nasacort, Mucinex D as needed    Orders Placed This Encounter   Procedures   • Comprehensive Metabolic Panel   • Lipid Panel With / Chol / HDL Ratio   • TSH+Free T4   • Urinalysis With Culture If Indicated - Urine, Clean Catch   • CBC & Differential     New Medications Ordered This Visit   Medications   • acyclovir (ZOVIRAX) 400 MG tablet     Si mg 3 times daily for 2 days with oral herpes outbreak     Dispense:  30 tablet     Refill:  1       SUMMARY/DISCUSSION  • Follow-up in 1 year for annual physical, earlier if needed    Next Appointment with me: Visit date not found    Return in about 1 year (around 3/31/2023) for Annual physical.      VITAL SIGNS     Visit Vitals  /72 (Cuff Size: Adult)   Pulse 109   Temp 97.1 °F (36.2 °C) (Temporal)   Ht 170.8 cm (67.25\")   Wt 77 kg (169 lb 12.8 oz)   SpO2 99%   BMI 26.40 kg/m²     Wt Readings from Last 3 Encounters:   22 77 kg (169 lb 12.8 oz)   10/08/21 70.9 kg (156 lb 3.2 oz)   21 67.7 kg (149 lb 3.2 oz)     Body mass index is 26.4 kg/m².      MEDICATIONS AT THE TIME OF OFFICE VISIT     Current Outpatient Medications on " File Prior to Visit   Medication Sig   • Ascorbic Acid (VITAMIN C ER PO) Take  by mouth.   • BIOTIN PO Take  by mouth.   • cetirizine (zyrTEC) 10 MG tablet Take 10 mg by mouth Daily.   • COLLAGEN PO Take  by mouth.   • Cyanocobalamin (VITAMIN B12 PO) Take  by mouth.   • GLUCOSAMINE-CHONDROITIN PO Take  by mouth.   • ketotifen (ZADITOR) 0.025 % ophthalmic solution Administer 2 drops to both eyes Daily.   • levocetirizine (XYZAL) 5 MG tablet Take 5 mg by mouth Every Evening.   • Methylsulfonylmethane (MSM PO) Take  by mouth.   • Omega-3 Fatty Acids (FISH OIL) 1000 MG capsule capsule Take  by mouth Daily With Breakfast.   • omeprazole OTC (PRILOSEC OTC) 20 MG EC tablet Take 1 tablet by mouth Daily.   • pseudoephedrine-guaifenesin (MUCINEX D)  MG per 12 hr tablet Take 1 tablet by mouth Every 12 (Twelve) Hours.   • Triamcinolone Acetonide (NASACORT) 55 MCG/ACT nasal inhaler 2 sprays into the nostril(s) as directed by provider Daily.   • [DISCONTINUED] acyclovir (ZOVIRAX) 400 MG tablet TAKE 1 TABLET BY MOUTH THREE TIMES DAILY AS NEEDED FOR OUTBREAK   • [DISCONTINUED] methylPREDNISolone (MEDROL) 4 MG dose pack Take as directed on package instructions.   • [DISCONTINUED] phenazopyridine (Pyridium) 100 MG tablet Take 1 tablet by mouth 3 (Three) Times a Day As Needed for Bladder Spasms.     No current facility-administered medications on file prior to visit.         HISTORY OF PRESENT ILLNESS     Patient presents to establish care with new provider in office.  Previous patient of Michelle BANDA.    GERD with esophagitis findings in 2019 on EGD.  On omeprazole 20 mg daily.  Well-controlled.    Environmental allergies controlled seasonally with Zyrtec/Xyzal, Nasacort and Mucinex D as needed.     Oral herpes simplex controlled with as needed desciclovir regimen.    Never smoker.  Not currently sexually active.    Partial hysterectomy.  No longer receiving pelvic exams are being followed by OB/GYN.  Had mammograms  performed through breast surgery with breast cyst biopsy.  Benign.  Next mammogram due May 2022.      REVIEW OF SYSTEMS     Constitutional neg except per HPI   Resp neg  CV neg    PHYSICAL EXAMINATION     Physical Exam  Constitutional  No distress  Cardiovascular Rate  normal . Rhythm: regular . Heart sounds:  normal  Pulmonary/Chest  Effort normal. Breath sounds:  normal  Psychiatric  Alert. Judgment and thought content normal. Mood normal     REVIEWED DATA     Labs:   Lab Results   Component Value Date    GLUCOSE 65 11/06/2020    BUN 12 11/06/2020    CREATININE 0.89 11/06/2020    EGFRIFNONA 70 11/06/2020    EGFRIFAFRI 84 11/06/2020    BCR 13.5 11/06/2020    K 4.2 11/06/2020    CO2 27.0 11/06/2020    CALCIUM 9.3 11/06/2020    PROTENTOTREF 6.6 11/06/2020    ALBUMIN 4.60 11/06/2020    LABIL2 2.3 11/06/2020    AST 15 11/06/2020    ALT 23 11/06/2020   No results found for: CHOL, CHLPL, TRIG, HDL, LDL, LDLDIRECT   No results found for: HGBA1C   Lab Results   Component Value Date    TSH 1.470 04/18/2019     Lab Results   Component Value Date    WBC 3.32 (L) 04/18/2019    HGB 13.1 04/18/2019    HCT 39.2 04/18/2019    MCV 93.8 04/18/2019     04/18/2019         Imaging:           Medical Tests:             Summary of old records / correspondence / consultant report:           Request outside records:           *Examiner was wearing medical surgical mask, face shield and exam gloves during the entire duration of the visit. Patient was masked the entire time.   Minimum social distance of 6 ft maintained entire visit except if physical contact was necessary as documented.     Dictated utilizing Dragon dictation

## 2022-04-01 ENCOUNTER — PATIENT ROUNDING (BHMG ONLY) (OUTPATIENT)
Dept: INTERNAL MEDICINE | Age: 42
End: 2022-04-01

## 2022-04-01 LAB
ALBUMIN SERPL-MCNC: 4.6 G/DL (ref 3.8–4.8)
ALBUMIN/GLOB SERPL: 2.1 {RATIO} (ref 1.2–2.2)
ALP SERPL-CCNC: 68 IU/L (ref 44–121)
ALT SERPL-CCNC: 20 IU/L (ref 0–32)
APPEARANCE UR: CLEAR
AST SERPL-CCNC: 27 IU/L (ref 0–40)
BACTERIA #/AREA URNS HPF: NORMAL /[HPF]
BASOPHILS # BLD AUTO: 0 X10E3/UL (ref 0–0.2)
BASOPHILS NFR BLD AUTO: 1 %
BILIRUB SERPL-MCNC: 1 MG/DL (ref 0–1.2)
BILIRUB UR QL STRIP: NEGATIVE
BUN SERPL-MCNC: 14 MG/DL (ref 6–24)
BUN/CREAT SERPL: 16 (ref 9–23)
CALCIUM SERPL-MCNC: 9.5 MG/DL (ref 8.7–10.2)
CASTS URNS QL MICRO: NORMAL /LPF
CHLORIDE SERPL-SCNC: 101 MMOL/L (ref 96–106)
CHOLEST SERPL-MCNC: 178 MG/DL (ref 100–199)
CHOLEST/HDLC SERPL: 2 RATIO (ref 0–4.4)
CO2 SERPL-SCNC: 23 MMOL/L (ref 20–29)
COLOR UR: YELLOW
CREAT SERPL-MCNC: 0.88 MG/DL (ref 0.57–1)
EGFRCR SERPLBLD CKD-EPI 2021: 84 ML/MIN/1.73
EOSINOPHIL # BLD AUTO: 0.1 X10E3/UL (ref 0–0.4)
EOSINOPHIL NFR BLD AUTO: 1 %
EPI CELLS #/AREA URNS HPF: NORMAL /HPF (ref 0–10)
ERYTHROCYTE [DISTWIDTH] IN BLOOD BY AUTOMATED COUNT: 11.6 % (ref 11.7–15.4)
GLOBULIN SER CALC-MCNC: 2.2 G/DL (ref 1.5–4.5)
GLUCOSE SERPL-MCNC: 85 MG/DL (ref 65–99)
GLUCOSE UR QL STRIP: NEGATIVE
HCT VFR BLD AUTO: 37.1 % (ref 34–46.6)
HDLC SERPL-MCNC: 90 MG/DL
HGB BLD-MCNC: 12.7 G/DL (ref 11.1–15.9)
HGB UR QL STRIP: NEGATIVE
IMM GRANULOCYTES # BLD AUTO: 0 X10E3/UL (ref 0–0.1)
IMM GRANULOCYTES NFR BLD AUTO: 0 %
KETONES UR QL STRIP: NEGATIVE
LDLC SERPL CALC-MCNC: 77 MG/DL (ref 0–99)
LEUKOCYTE ESTERASE UR QL STRIP: NEGATIVE
LYMPHOCYTES # BLD AUTO: 1.2 X10E3/UL (ref 0.7–3.1)
LYMPHOCYTES NFR BLD AUTO: 27 %
MCH RBC QN AUTO: 32.5 PG (ref 26.6–33)
MCHC RBC AUTO-ENTMCNC: 34.2 G/DL (ref 31.5–35.7)
MCV RBC AUTO: 95 FL (ref 79–97)
MICRO URNS: NORMAL
MICRO URNS: NORMAL
MONOCYTES # BLD AUTO: 0.3 X10E3/UL (ref 0.1–0.9)
MONOCYTES NFR BLD AUTO: 6 %
NEUTROPHILS # BLD AUTO: 2.8 X10E3/UL (ref 1.4–7)
NEUTROPHILS NFR BLD AUTO: 65 %
NITRITE UR QL STRIP: NEGATIVE
PH UR STRIP: 7 [PH] (ref 5–7.5)
PLATELET # BLD AUTO: 185 X10E3/UL (ref 150–450)
POTASSIUM SERPL-SCNC: 4.4 MMOL/L (ref 3.5–5.2)
PROT SERPL-MCNC: 6.8 G/DL (ref 6–8.5)
PROT UR QL STRIP: NEGATIVE
RBC # BLD AUTO: 3.91 X10E6/UL (ref 3.77–5.28)
RBC #/AREA URNS HPF: NORMAL /HPF (ref 0–2)
SODIUM SERPL-SCNC: 140 MMOL/L (ref 134–144)
SP GR UR STRIP: 1.02 (ref 1–1.03)
T4 FREE SERPL-MCNC: 1.23 NG/DL (ref 0.82–1.77)
TRIGL SERPL-MCNC: 58 MG/DL (ref 0–149)
TSH SERPL DL<=0.005 MIU/L-ACNC: 2.23 UIU/ML (ref 0.45–4.5)
URINALYSIS REFLEX: NORMAL
UROBILINOGEN UR STRIP-MCNC: 0.2 MG/DL (ref 0.2–1)
VLDLC SERPL CALC-MCNC: 11 MG/DL (ref 5–40)
WBC # BLD AUTO: 4.3 X10E3/UL (ref 3.4–10.8)
WBC #/AREA URNS HPF: NORMAL /HPF (ref 0–5)

## 2022-04-01 NOTE — PROGRESS NOTES
A My-Chart message has been sent to the patient for PATIENT ROUNDING with [facility]    Physical Therapy Treatment    Patient Name:  Julee Hawkins   MRN:  [id number]    Recommendations:     Discharge Recommendations:  nursing facility, skilled   Discharge Equipment Recommendations: walker, rolling, bedside commode, bath bench   Barriers to discharge: decreased functional mobility    Assessment:     Julee Hawkins is a 73 y.o. female admitted with a medical diagnosis of Pneumoperitoneum.  She presents with the following impairments/functional limitations:  weakness, impaired endurance, impaired self care skills, impaired functional mobilty, gait instability, impaired balance, impaired cardiopulmonary response to activity. Pt limited by pain and slow pace of mobility. Pt encouraged to increase mobility and transfer UIC or ambulate in room with NSG and use of RW. Pt will continue to benefit from therapy services to address impairments listed above.     Rehab Prognosis: Good; patient would benefit from acute skilled PT services to address these deficits and reach maximum level of function.    Recent Surgery: Procedure(s) (LRB):  LAPAROTOMY, EXPLORATORY (N/A)  CLOSURE, ULCER, PERFORATED, DUODENUM, USING OMENTAL PATCH  BIOPSY, LIVER  INSERTION, PEG TUBE 6 Days Post-Op    Plan:     During this hospitalization, patient to be seen 4 x/week to address the identified rehab impairments via gait training, therapeutic activities, therapeutic exercises and progress toward the following goals:    · Plan of Care Expires:  04/09/20    Subjective     Chief Complaint: pain  Pain/Comfort:  · Pain Rating 1: 7/10  · Location - Orientation 1: generalized  · Location 1: abdomen  · Pain Addressed 1: Pre-medicate for activity, Reposition, Distraction  · Pain Rating Post-Intervention 1: 7/10      Objective:     Communicated with NSG prior to session.  Patient found supine with telemetry, peripheral IV, PICC line upon PTA entry to room.     General Precautions: Standard, fall   Orthopedic Precautions:N/A   Braces: N/A      Functional Mobility:  · Bed Mobility:     · Rolling Left:  stand by assistance  · Supine to Sit: stand by assistance  · Transfers:     · Sit to Stand:  minimum assistance with rolling walker  · Bed to Chair: contact guard assistance with  rolling walker  using  Step Transfer  · Toilet Transfer: minimum assistance with  rolling walker  using  Step Transfer  · Gait: Pt ambulates ~ 15 x 2 trials with RW and CGA. Pt self-limiting with pt requesting bathroom break on first trial and then request further seated rest after standing from toilet following toileting assistance.       AM-PAC 6 CLICK MOBILITY  Turning over in bed (including adjusting bedclothes, sheets and blankets)?: 3  Sitting down on and standing up from a chair with arms (e.g., wheelchair, bedside commode, etc.): 3  Moving from lying on back to sitting on the side of the bed?: 3  Moving to and from a bed to a chair (including a wheelchair)?: 3  Need to walk in hospital room?: 3  Climbing 3-5 steps with a railing?: 2  Basic Mobility Total Score: 17       Therapeutic Activities and Exercises:   Pt assisted with functional mobility as noted above.   Pt with extremely slow pace of mobility requiring increased time for decreased assistance with all tasks.   Pt assisted to bathroom for toilet transfer and positive void. Pt able to perform pericare in sitting with set-up assistance.  Pt reports heavy fatigue and requests return to chair for further rest.       Patient left up in chair with all lines intact and call button in reach.    GOALS:   Multidisciplinary Problems     Physical Therapy Goals        Problem: Physical Therapy Goal    Goal Priority Disciplines Outcome Goal Variances Interventions   Physical Therapy Goal     PT, PT/OT Ongoing, Progressing     Description:  Goals to be met by: 3/25/20    Patient will increase functional independence with mobility by performin. Supine to sit with Stand-by Assistance -met  2. Sit to stand transfer with  Supervision -not met  3. Gait  x 150 feet with Contact Guard Assistance using AD if needed.. -not met  4. Ascend/descend 4 stair with no Handrails Contact Guard Assistance . -not met                       Time Tracking:     PT Received On: 03/16/20  PT Start Time: 1119     PT Stop Time: 1206  PT Total Time (min): 47 min     Billable Minutes: Gait Training 14 and Therapeutic Activity 33    Treatment Type: Treatment  PT/PTA: PTA     PTA Visit Number: 1     Stephan Calderon, PTA  03/16/2020

## 2022-04-19 ENCOUNTER — OFFICE VISIT (OUTPATIENT)
Dept: INTERNAL MEDICINE | Age: 42
End: 2022-04-19

## 2022-04-19 VITALS
DIASTOLIC BLOOD PRESSURE: 76 MMHG | OXYGEN SATURATION: 98 % | TEMPERATURE: 97.3 F | SYSTOLIC BLOOD PRESSURE: 138 MMHG | HEIGHT: 67 IN | BODY MASS INDEX: 26.81 KG/M2 | WEIGHT: 170.8 LBS | HEART RATE: 97 BPM

## 2022-04-19 DIAGNOSIS — J01.90 ACUTE SINUSITIS, RECURRENCE NOT SPECIFIED, UNSPECIFIED LOCATION: Primary | ICD-10-CM

## 2022-04-19 PROCEDURE — 99213 OFFICE O/P EST LOW 20 MIN: CPT | Performed by: NURSE PRACTITIONER

## 2022-04-19 RX ORDER — MOXIFLOXACIN HYDROCHLORIDE 400 MG/1
400 TABLET ORAL DAILY
Qty: 10 TABLET | Refills: 0 | Status: SHIPPED | OUTPATIENT
Start: 2022-04-19 | End: 2022-04-29

## 2022-04-19 RX ORDER — FLUCONAZOLE 150 MG/1
150 TABLET ORAL ONCE
Qty: 2 TABLET | Refills: 0 | Status: SHIPPED | OUTPATIENT
Start: 2022-04-19 | End: 2022-04-19

## 2022-04-19 NOTE — PROGRESS NOTES
"    I N T E R N A L  M E D I C I N E  CARLOS MUÑOZ, APRN      ENCOUNTER DATE:  04/19/2022    Sera Xavier / 42 y.o. / female      CHIEF COMPLAINT / REASON FOR OFFICE VISIT     Sinus Problem (X2 weeks) and Cough (Productive/green in am)      ASSESSMENT & PLAN     1. Acute sinusitis, recurrence not specified, unspecified location  -Avelox 400 mg daily for 10 days  -Continue Xyzal  -Continue Nasacort    No orders of the defined types were placed in this encounter.    New Medications Ordered This Visit   Medications   • moxifloxacin (Avelox) 400 MG tablet     Sig: Take 1 tablet by mouth Daily for 10 days.     Dispense:  10 tablet     Refill:  0   • fluconazole (Diflucan) 150 MG tablet     Sig: Take 1 tablet by mouth 1 (One) Time for 1 dose. Take one tablet now and one in 72 hours     Dispense:  2 tablet     Refill:  0       SUMMARY/DISCUSSION  · Follow-up if symptoms do not improve or worsen    Next Appointment with me: Visit date not found    No follow-ups on file.      VITAL SIGNS     Visit Vitals  /76 (Cuff Size: Adult)   Pulse 97   Temp 97.3 °F (36.3 °C) (Temporal)   Ht 170.8 cm (67.25\")   Wt 77.5 kg (170 lb 12.8 oz)   SpO2 98%   BMI 26.55 kg/m²       @BP@  Wt Readings from Last 3 Encounters:   04/19/22 77.5 kg (170 lb 12.8 oz)   03/31/22 77 kg (169 lb 12.8 oz)   10/08/21 70.9 kg (156 lb 3.2 oz)     Body mass index is 26.55 kg/m².      MEDICATIONS AT THE TIME OF OFFICE VISIT     Current Outpatient Medications on File Prior to Visit   Medication Sig   • Ascorbic Acid (VITAMIN C ER PO) Take  by mouth.   • BIOTIN PO Take  by mouth.   • cetirizine (zyrTEC) 10 MG tablet Take 10 mg by mouth Daily.   • COLLAGEN PO Take  by mouth.   • Cyanocobalamin (VITAMIN B12 PO) Take  by mouth.   • GLUCOSAMINE-CHONDROITIN PO Take  by mouth.   • ketotifen (ZADITOR) 0.025 % ophthalmic solution Administer 2 drops to both eyes Daily.   • levocetirizine (XYZAL) 5 MG tablet Take 5 mg by mouth Every Evening.   • Methylsulfonylmethane " (MSM PO) Take  by mouth.   • Omega-3 Fatty Acids (FISH OIL) 1000 MG capsule capsule Take  by mouth Daily With Breakfast.   • omeprazole OTC (PRILOSEC OTC) 20 MG EC tablet Take 1 tablet by mouth Daily.   • pseudoephedrine-guaifenesin (MUCINEX D)  MG per 12 hr tablet Take 1 tablet by mouth Every 12 (Twelve) Hours.   • Triamcinolone Acetonide (NASACORT) 55 MCG/ACT nasal inhaler 2 sprays into the nostril(s) as directed by provider Daily.   • acyclovir (ZOVIRAX) 400 MG tablet 800 mg 3 times daily for 2 days with oral herpes outbreak     No current facility-administered medications on file prior to visit.         HISTORY OF PRESENT ILLNESS     Patient presents with symptoms of nasal congestion, sinus pressure and pain, nasal congestion, postnasal drip and cough as a result.  Significant history of allergy.  She has been followed by an allergist in the past with only successful treatment of acute sinusitis with Avelox.  No shortness of breath, no GI symptoms.  No fever, chills or muscle or body aches.      REVIEW OF SYSTEMS     Constitutional neg except per HPI   ENT nasal congestion, postnasal drip  Resp   CV neg    PHYSICAL EXAMINATION     Physical Exam  Constitutional  No distress  ENT nasal congestion; tenderness with palpation to sinus  Cardiovascular Rate  normal . Rhythm: regular . Heart sounds:  normal  Pulmonary/Chest  Effort normal. Breath sounds:  normal  Psychiatric  Alert. Judgment and thought content normal. Mood normal     REVIEWED DATA     Labs:   Lab Results   Component Value Date    GLUCOSE 85 03/31/2022    BUN 14 03/31/2022    CREATININE 0.88 03/31/2022    EGFRIFNONA 70 11/06/2020    EGFRIFAFRI 84 11/06/2020    BCR 16 03/31/2022    K 4.4 03/31/2022    CO2 23 03/31/2022    CALCIUM 9.5 03/31/2022    PROTENTOTREF 6.8 03/31/2022    ALBUMIN 4.6 03/31/2022    LABIL2 2.1 03/31/2022    AST 27 03/31/2022    ALT 20 03/31/2022         Imaging:           Medical Tests:             Summary of old records /  correspondence / consultant report:           Request outside records:           *Examiner was wearing medical surgical mask, face shield and exam gloves during the entire duration of the visit. Patient was masked the entire time.   Minimum social distance of 6 ft maintained entire visit except if physical contact was necessary as documented.     Dictated utilizing Dragon dictation

## 2022-07-06 DIAGNOSIS — B00.1 RECURRENT COLD SORES: ICD-10-CM

## 2022-07-07 RX ORDER — ACYCLOVIR 400 MG/1
TABLET ORAL
Qty: 30 TABLET | Refills: 1 | Status: SHIPPED | OUTPATIENT
Start: 2022-07-07 | End: 2022-09-21

## 2022-09-20 ENCOUNTER — OFFICE VISIT (OUTPATIENT)
Dept: INTERNAL MEDICINE | Age: 42
End: 2022-09-20

## 2022-09-20 VITALS
HEART RATE: 85 BPM | DIASTOLIC BLOOD PRESSURE: 80 MMHG | OXYGEN SATURATION: 99 % | HEIGHT: 67 IN | SYSTOLIC BLOOD PRESSURE: 120 MMHG | WEIGHT: 164 LBS | TEMPERATURE: 97.1 F | BODY MASS INDEX: 25.74 KG/M2

## 2022-09-20 DIAGNOSIS — B00.1 RECURRENT COLD SORES: ICD-10-CM

## 2022-09-20 DIAGNOSIS — L30.9 DERMATITIS: Primary | ICD-10-CM

## 2022-09-20 PROCEDURE — 99213 OFFICE O/P EST LOW 20 MIN: CPT | Performed by: NURSE PRACTITIONER

## 2022-09-20 NOTE — PROGRESS NOTES
"    I N T E R N A L  M E D I C I N E  SOLO Cotter    ENCOUNTER DATE:  09/20/2022    Sera Xavier / 42 y.o. / female      CHIEF COMPLAINT / REASON FOR OFFICE VISIT     Rash/Open Wound (Under left arm pit )      ASSESSMENT & PLAN     Diagnoses and all orders for this visit:    1. Dermatitis (Primary)       - Instructed Cortisone cream to the dermatitis as directed       - Has an open wound under left arm pit from shaving- instructed daily neosporin/bacitracin and dry gauze until healed    SUMMARY/DISCUSSION  • Follow up with SOLO Briones as scheduled and as neede  • I spent 15 min in direct care of this patient on this date of service. This time includes times spent by me in the following activities: Preparing for the visit, obtaining and/or reviewing a separately obtained history, performing a medically appropriate examination and/or evaluation, reviewing medical records, reviewing tests, ordering medications, tests, or procedures, counseling and educating the patient, documenting information in the medical record and reviewing office note/correspondence from other providers.     Return in about 4 weeks (around 10/18/2022) for Next scheduled follow up.      VITAL SIGNS     Visit Vitals  /80   Pulse 85   Temp 97.1 °F (36.2 °C)   Ht 170.8 cm (67.24\")   Wt 74.4 kg (164 lb)   SpO2 99%   BMI 25.50 kg/m²           BP Readings from Last 3 Encounters:   09/20/22 120/80   04/19/22 138/76   03/31/22 126/72     Wt Readings from Last 3 Encounters:   09/20/22 74.4 kg (164 lb)   04/19/22 77.5 kg (170 lb 12.8 oz)   03/31/22 77 kg (169 lb 12.8 oz)     Body mass index is 25.5 kg/m².    Blood pressure readings recorded on patient flowsheet:  No flowsheet data found.          MEDICATIONS AT THE TIME OF OFFICE VISIT     Current Outpatient Medications on File Prior to Visit   Medication Sig Dispense Refill   • acyclovir (ZOVIRAX) 400 MG tablet TAKE 2 TABLETS BY MOUTH THREE TIMES DAILY FOR 2 DAYS WITH ORAL HERPES " OUTBREAK 30 tablet 1   • Ascorbic Acid (VITAMIN C ER PO) Take  by mouth.     • BIOTIN PO Take  by mouth.     • cetirizine (zyrTEC) 10 MG tablet Take 10 mg by mouth Daily.     • COLLAGEN PO Take  by mouth.     • Cyanocobalamin (VITAMIN B12 PO) Take  by mouth.     • GLUCOSAMINE-CHONDROITIN PO Take  by mouth.     • ketotifen (ZADITOR) 0.025 % ophthalmic solution Administer 2 drops to both eyes Daily.     • levocetirizine (XYZAL) 5 MG tablet Take 5 mg by mouth Every Evening.     • Methylsulfonylmethane (MSM PO) Take  by mouth.     • Omega-3 Fatty Acids (FISH OIL) 1000 MG capsule capsule Take  by mouth Daily With Breakfast.     • omeprazole OTC (PRILOSEC OTC) 20 MG EC tablet Take 1 tablet by mouth Daily.     • pseudoephedrine-guaifenesin (MUCINEX D)  MG per 12 hr tablet Take 1 tablet by mouth Every 12 (Twelve) Hours.     • Triamcinolone Acetonide (NASACORT) 55 MCG/ACT nasal inhaler 2 sprays into the nostril(s) as directed by provider Daily.       No current facility-administered medications on file prior to visit.        HISTORY OF PRESENT ILLNESS     42 year old female being seen today for rash and open wound under left arm pit. States tried a new deodorant and broke out in a rash- mostly clear today but has a open wound from cutting herself while shaving. Instructed to call if drainage, odor, or fever.     Patient Care Team:  Danny Hilton APRN as PCP - General (Internal Medicine)    REVIEW OF SYSTEMS     Review of Systems   Constitutional: Negative for fever.   HENT: Negative.    Respiratory: Negative.    Cardiovascular: Negative.    Skin: Positive for rash and wound.   Psychiatric/Behavioral: Negative.           PHYSICAL EXAMINATION     Physical Exam  Cardiovascular:      Rate and Rhythm: Normal rate and regular rhythm.      Pulses: Normal pulses.      Heart sounds: Normal heart sounds.   Pulmonary:      Effort: Pulmonary effort is normal.      Breath sounds: Normal breath sounds.   Skin:     Findings: Lesion  and rash present.      Comments: Open wound under left arm pit from shaving with mild dermatitis   Neurological:      Mental Status: She is alert.             REVIEWED DATA     Labs:     Lab Results   Component Value Date     03/31/2022    K 4.4 03/31/2022    CALCIUM 9.5 03/31/2022    AST 27 03/31/2022    ALT 20 03/31/2022    BUN 14 03/31/2022    CREATININE 0.88 03/31/2022    CREATININE 0.89 11/06/2020    CREATININE 0.89 04/18/2019    EGFRIFNONA 70 11/06/2020    EGFRIFAFRI 84 11/06/2020       No results found for: HGBA1C    Lab Results   Component Value Date    LDL 77 03/31/2022    HDL 90 03/31/2022    TRIG 58 03/31/2022       Lab Results   Component Value Date    TSH 2.230 03/31/2022    TSH 1.470 04/18/2019    FREET4 1.23 03/31/2022       Lab Results   Component Value Date    WBC 4.3 03/31/2022    HGB 12.7 03/31/2022     03/31/2022       No results found for: MALBCRERATIO       Imaging:           Medical Tests:           Summary of old records / correspondence / consultant report:           Request outside records:           SOLO Cotter

## 2022-09-21 ENCOUNTER — OFFICE VISIT (OUTPATIENT)
Dept: INTERNAL MEDICINE | Age: 42
End: 2022-09-21

## 2022-09-21 VITALS
HEIGHT: 67 IN | HEART RATE: 91 BPM | TEMPERATURE: 97.5 F | SYSTOLIC BLOOD PRESSURE: 120 MMHG | DIASTOLIC BLOOD PRESSURE: 80 MMHG | BODY MASS INDEX: 25.58 KG/M2 | WEIGHT: 163 LBS | OXYGEN SATURATION: 99 %

## 2022-09-21 DIAGNOSIS — L98.491 SKIN ULCER, LIMITED TO BREAKDOWN OF SKIN: Primary | ICD-10-CM

## 2022-09-21 DIAGNOSIS — B37.31 CANDIDA VAGINITIS: ICD-10-CM

## 2022-09-21 PROCEDURE — 99213 OFFICE O/P EST LOW 20 MIN: CPT | Performed by: INTERNAL MEDICINE

## 2022-09-21 RX ORDER — FLUCONAZOLE 150 MG/1
150 TABLET ORAL DAILY
Qty: 2 TABLET | Refills: 0 | Status: SHIPPED | OUTPATIENT
Start: 2022-09-21 | End: 2022-10-21 | Stop reason: SDUPTHER

## 2022-09-21 RX ORDER — MUPIROCIN CALCIUM 20 MG/G
1 CREAM TOPICAL 2 TIMES DAILY
Qty: 15 G | Refills: 0 | Status: SHIPPED | OUTPATIENT
Start: 2022-09-21

## 2022-09-21 RX ORDER — ACYCLOVIR 400 MG/1
TABLET ORAL
Qty: 30 TABLET | Refills: 1 | Status: SHIPPED | OUTPATIENT
Start: 2022-09-21 | End: 2022-12-14

## 2022-09-21 NOTE — PROGRESS NOTES
"    I N T E R N A L  M E D I C I N E  J U N O H  K I M,  M D      ENCOUNTER DATE:  09/21/2022    Sera Xavier / 42 y.o. / female      CHIEF COMPLAINT / REASON FOR OFFICE VISIT     Rash (Painful left under arm bit. ) and Vaginitis      ASSESSMENT & PLAN     1. Skin ulcer, limited to breakdown of skin (HCC)    2. Candida vaginitis      No orders of the defined types were placed in this encounter.    New Medications Ordered This Visit   Medications   • mupirocin (Bactroban) 2 % cream     Sig: Apply 1 application topically to the appropriate area as directed 2 (Two) Times a Day.     Dispense:  15 g     Refill:  0   • fluconazole (Diflucan) 150 MG tablet     Sig: Take 1 tablet by mouth Daily.     Dispense:  2 tablet     Refill:  0       SUMMARY/DISCUSSION  • Reassurance. Call or Return to office if not improving/or worsening if not improving.  • Avoid shaving or irritating the area  • May wash with soap and water; avoid rubbing alcohol or hydrogen peroxide.   • Recommended using 4-5 bladed shaver or disposable shaver.       Next Appointment with me: Visit date not found    No follow-ups on file.        VITAL SIGNS     Vitals:    09/21/22 1137   BP: 120/80   BP Location: Left arm   Pulse: 91   Temp: 97.5 °F (36.4 °C)   SpO2: 99%   Weight: 73.9 kg (163 lb)   Height: 170.8 cm (67.24\")       BP Readings from Last 3 Encounters:   09/21/22 120/80   09/20/22 120/80   04/19/22 138/76     Wt Readings from Last 3 Encounters:   09/21/22 73.9 kg (163 lb)   09/20/22 74.4 kg (164 lb)   04/19/22 77.5 kg (170 lb 12.8 oz)     Body mass index is 25.35 kg/m².    Blood pressure readings recorded on patient flowsheet:  No flowsheet data found.     MEDICATIONS AT THE TIME OF OFFICE VISIT     Current Outpatient Medications on File Prior to Visit   Medication Sig   • acyclovir (ZOVIRAX) 400 MG tablet TAKE TWO TABLETS BY MOUTH THREE TIMES DAILY FOR TWO DAYS   • Ascorbic Acid (VITAMIN C ER PO) Take  by mouth.   • BIOTIN PO Take  by mouth.   • " cetirizine (zyrTEC) 10 MG tablet Take 10 mg by mouth Daily.   • COLLAGEN PO Take  by mouth.   • Cyanocobalamin (VITAMIN B12 PO) Take  by mouth.   • GLUCOSAMINE-CHONDROITIN PO Take  by mouth.   • ketotifen (ZADITOR) 0.025 % ophthalmic solution Administer 2 drops to both eyes Daily.   • levocetirizine (XYZAL) 5 MG tablet Take 5 mg by mouth Every Evening.   • Methylsulfonylmethane (MSM PO) Take  by mouth.   • Omega-3 Fatty Acids (FISH OIL) 1000 MG capsule capsule Take  by mouth Daily With Breakfast.   • omeprazole OTC (PRILOSEC OTC) 20 MG EC tablet Take 1 tablet by mouth Daily.   • pseudoephedrine-guaifenesin (MUCINEX D)  MG per 12 hr tablet Take 1 tablet by mouth Every 12 (Twelve) Hours.   • Triamcinolone Acetonide (NASACORT) 55 MCG/ACT nasal inhaler 2 sprays into the nostril(s) as directed by provider Daily.   • [DISCONTINUED] acyclovir (ZOVIRAX) 400 MG tablet TAKE 2 TABLETS BY MOUTH THREE TIMES DAILY FOR 2 DAYS WITH ORAL HERPES OUTBREAK     No current facility-administered medications on file prior to visit.          HISTORY OF PRESENT ILLNESS     Developed painful superficial skin ulceration of the left axilla after applying topical clobetasol for skin lesion. Not certain what the original skin problem may have been.     She thinks she may have vaginal yeast infection. Requests prescription for diflucan.     REVIEW OF SYSTEMS     No fever or chills   Vaginitis       PHYSICAL EXAMINATION     Physical Exam  Constitutional:       General: She is not in acute distress.     Appearance: She is not ill-appearing.   Chest:       Neurological:      Mental Status: She is alert.             REVIEWED DATA     Labs:     Lab Results   Component Value Date     03/31/2022    K 4.4 03/31/2022    CALCIUM 9.5 03/31/2022    AST 27 03/31/2022    ALT 20 03/31/2022    BUN 14 03/31/2022    CREATININE 0.88 03/31/2022    CREATININE 0.89 11/06/2020    CREATININE 0.89 04/18/2019    EGFRIFNONA 70 11/06/2020    EGFRIFAFRI 84  11/06/2020       No results found for: HGBA1C    Lab Results   Component Value Date    LDL 77 03/31/2022    HDL 90 03/31/2022    TRIG 58 03/31/2022       Lab Results   Component Value Date    TSH 2.230 03/31/2022    TSH 1.470 04/18/2019    FREET4 1.23 03/31/2022       Lab Results   Component Value Date    WBC 4.3 03/31/2022    HGB 12.7 03/31/2022     03/31/2022       No results found for: MALBCRERATIO       Imaging:           Medical Tests:           Summary of old records / correspondence / consultant report:           Request outside records:             *Examiner was wearing KN95 mask and eye protection during the entire duration of the visit. Patient was masked the entire time. Minimum social distance of 6 ft maintained entire visit except if physical contact was necessary as documented.       Template created by Phillip rBavo MD

## 2022-10-21 ENCOUNTER — TELEPHONE (OUTPATIENT)
Dept: INTERNAL MEDICINE | Age: 42
End: 2022-10-21

## 2022-10-21 ENCOUNTER — OFFICE VISIT (OUTPATIENT)
Dept: INTERNAL MEDICINE | Age: 42
End: 2022-10-21

## 2022-10-21 VITALS
BODY MASS INDEX: 26.37 KG/M2 | TEMPERATURE: 99.1 F | WEIGHT: 168 LBS | DIASTOLIC BLOOD PRESSURE: 80 MMHG | SYSTOLIC BLOOD PRESSURE: 120 MMHG | HEIGHT: 67 IN | HEART RATE: 64 BPM

## 2022-10-21 DIAGNOSIS — J01.01 ACUTE RECURRENT MAXILLARY SINUSITIS: Primary | ICD-10-CM

## 2022-10-21 PROCEDURE — 99213 OFFICE O/P EST LOW 20 MIN: CPT

## 2022-10-21 RX ORDER — MOXIFLOXACIN HYDROCHLORIDE 400 MG/1
400 TABLET ORAL DAILY
Qty: 5 TABLET | Refills: 0 | Status: SHIPPED | OUTPATIENT
Start: 2022-10-21 | End: 2022-10-25 | Stop reason: SDUPTHER

## 2022-10-21 RX ORDER — FLUCONAZOLE 150 MG/1
150 TABLET ORAL DAILY
Qty: 2 TABLET | Refills: 0 | Status: SHIPPED | OUTPATIENT
Start: 2022-10-21 | End: 2022-11-02 | Stop reason: SDUPTHER

## 2022-10-21 RX ORDER — DOXYCYCLINE HYCLATE 100 MG/1
100 CAPSULE ORAL 2 TIMES DAILY
Qty: 14 CAPSULE | Refills: 0 | Status: CANCELLED | OUTPATIENT
Start: 2022-10-21 | End: 2022-10-28

## 2022-10-21 NOTE — TELEPHONE ENCOUNTER
Caller: Sera Xavier    Relationship: Self    Best call back number: * [phone] (Mobile)      What was the call regarding:  TRYING TO PARK AT THE MOMENT  / ON HER WAY IN FOR APPT

## 2022-10-21 NOTE — PROGRESS NOTES
"    I N T E R N A L  M E D I C I N E  Suzanne Herrera, SOLO    ENCOUNTER DATE:  10/21/2022    Sera Xavier / 42 y.o. / female      CHIEF COMPLAINT / REASON FOR OFFICE VISIT     Sinusitis      ASSESSMENT & PLAN     Diagnoses and all orders for this visit:    1. Acute recurrent maxillary sinusitis (Primary)  -     moxifloxacin (Avelox) 400 MG tablet; Take 1 tablet by mouth Daily for 5 days.  Dispense: 5 tablet; Refill: 0         SUMMARY/DISCUSSION  • Lengthy discussion had with pt about concerns for antibiotic resistance and side effects when using quinolone antibiotics vs doxycycline for sinusitis.  She understands risks/ benefit and would like to proceed with moxifloxacin.    • She is aware to return for worsening or non improving symptoms.      Next Appointment with me: Visit date not found    Return for Next scheduled follow up.      VITAL SIGNS     Visit Vitals  /80   Pulse 64   Temp 99.1 °F (37.3 °C) (Oral)   Ht 170.8 cm (67.24\")   Wt 76.2 kg (168 lb)   PF 98 L/min   BMI 26.12 kg/m²             Wt Readings from Last 3 Encounters:   10/21/22 76.2 kg (168 lb)   09/21/22 73.9 kg (163 lb)   09/20/22 74.4 kg (164 lb)     Body mass index is 26.12 kg/m².        MEDICATIONS AT THE TIME OF OFFICE VISIT     Current Outpatient Medications on File Prior to Visit   Medication Sig Dispense Refill   • acyclovir (ZOVIRAX) 400 MG tablet TAKE TWO TABLETS BY MOUTH THREE TIMES DAILY FOR TWO DAYS 30 tablet 1   • Ascorbic Acid (VITAMIN C ER PO) Take  by mouth.     • BIOTIN PO Take  by mouth.     • cetirizine (zyrTEC) 10 MG tablet Take 10 mg by mouth Daily.     • COLLAGEN PO Take  by mouth.     • Cyanocobalamin (VITAMIN B12 PO) Take  by mouth.     • fluconazole (Diflucan) 150 MG tablet Take 1 tablet by mouth Daily. 2 tablet 0   • GLUCOSAMINE-CHONDROITIN PO Take  by mouth.     • ketotifen (ZADITOR) 0.025 % ophthalmic solution Administer 2 drops to both eyes Daily.     • levocetirizine (XYZAL) 5 MG tablet Take 5 mg by mouth Every " Evening.     • Methylsulfonylmethane (MSM PO) Take  by mouth.     • mupirocin (Bactroban) 2 % cream Apply 1 application topically to the appropriate area as directed 2 (Two) Times a Day. 15 g 0   • Omega-3 Fatty Acids (FISH OIL) 1000 MG capsule capsule Take  by mouth Daily With Breakfast.     • omeprazole OTC (PRILOSEC OTC) 20 MG EC tablet Take 1 tablet by mouth Daily.     • pseudoephedrine-guaifenesin (MUCINEX D)  MG per 12 hr tablet Take 1 tablet by mouth Every 12 (Twelve) Hours.     • Triamcinolone Acetonide (NASACORT) 55 MCG/ACT nasal inhaler 2 sprays into the nostril(s) as directed by provider Daily.       No current facility-administered medications on file prior to visit.        HISTORY OF PRESENT ILLNESS     Symptoms started on October 8 with head congestion, sore throat, fatigue, pressure in R ear, and discomfort in R cheek.  She has prior hx of frequent sinus infections.  Symptoms initially started to get better and then got worse again.  No fever/ chills.  She has allergies to PCN/ sulfa, and treatment failure for sinusitis with prior antibiotic use.  She is unsure whether she has taken doxycycline before.  She states Avelox is the only antibiotic that has worked for her sinus infections.      She has personal history of significant allergies and takes Zyrtec daily, Xyzal nightly, and Nasacort nasal spray.  Taking Mucinex D currently.      Patient Care Team:  Danny Hilton APRN as PCP - General (Internal Medicine)    REVIEW OF SYSTEMS     Review of Systems   Constitutional: Negative for chills, fever and unexpected weight change.   HENT: Positive for ear pain, sinus pressure and sinus pain. Negative for ear discharge.    Respiratory: Negative for cough, chest tightness and shortness of breath.    Cardiovascular: Negative for chest pain, palpitations and leg swelling.   Neurological: Negative for dizziness, weakness, light-headedness and headaches.   Psychiatric/Behavioral: The patient is not  nervous/anxious.           PHYSICAL EXAMINATION     Physical Exam  Vitals reviewed.   Constitutional:       General: She is not in acute distress.     Appearance: Normal appearance. She is not ill-appearing, toxic-appearing or diaphoretic.   HENT:      Head: Normocephalic and atraumatic.      Right Ear: Ear canal and external ear normal. A middle ear effusion is present. There is no impacted cerumen.      Left Ear: Tympanic membrane, ear canal and external ear normal. There is no impacted cerumen.      Nose: No congestion or rhinorrhea.      Right Sinus: Maxillary sinus tenderness and frontal sinus tenderness present.      Left Sinus: Maxillary sinus tenderness and frontal sinus tenderness present.      Mouth/Throat:      Mouth: Mucous membranes are moist.      Pharynx: Oropharynx is clear. No oropharyngeal exudate or posterior oropharyngeal erythema.   Cardiovascular:      Rate and Rhythm: Normal rate and regular rhythm.      Heart sounds: Normal heart sounds.   Pulmonary:      Effort: Pulmonary effort is normal.      Breath sounds: Normal breath sounds.   Neurological:      Mental Status: She is alert and oriented to person, place, and time. Mental status is at baseline.   Psychiatric:         Mood and Affect: Mood normal.         Behavior: Behavior normal.         Thought Content: Thought content normal.         Judgment: Judgment normal.           REVIEWED DATA     Labs:           Imaging:            Medical Tests:           Summary of old records / correspondence / consultant report:           Request outside records:

## 2022-10-21 NOTE — TELEPHONE ENCOUNTER
Caller: Sera Xavier    Relationship: Self    Best call back number: [phone]    What was the call regarding: PATIENT STATED SHE HAD A VISIT WITH DOMINIC BERGERON TODAY 10/21/22.    SHE STATED DOMINIC SENT HER IN SN ANTIBIOTIC AND YEAST PILLS.    SHE STATED THE PHARMACY HAD THE ANTIBIOTIC BUT NOT THE YEAST PILLS.    SHE WOULD LIKE FOR THOSE TO BE SENT IN TO [id number] [address] AT [facility] [phone] [facility] [phone]     PATIENT WOULD LIKE A CALL ONCE THIS HAS BEEN SENT.    Do you require a callback: YES

## 2022-10-25 ENCOUNTER — TELEPHONE (OUTPATIENT)
Dept: INTERNAL MEDICINE | Age: 42
End: 2022-10-25

## 2022-10-25 DIAGNOSIS — J01.01 ACUTE RECURRENT MAXILLARY SINUSITIS: ICD-10-CM

## 2022-10-25 RX ORDER — MOXIFLOXACIN HYDROCHLORIDE 400 MG/1
400 TABLET ORAL DAILY
Qty: 5 TABLET | Refills: 0 | Status: SHIPPED | OUTPATIENT
Start: 2022-10-25 | End: 2022-10-30

## 2022-10-25 NOTE — TELEPHONE ENCOUNTER
Caller: Sera Xaveir    Relationship to patient: Self    Best call back number: [phone]    Patient is needing: THE PATIENT STATES THAT SHE IS STILL HAVING AN INCREASE IN TEMPERATURE (99.1) AND SHE IS NOT FEELING WELL. THE PATIENT IS CONCERNED ABOUT NOT HAVING ENOUGH ANTIBIOTICS TO HELP ALLEVIATE HER SYMPTOMS. PLEASE ADVISE.

## 2022-10-25 NOTE — TELEPHONE ENCOUNTER
Caller: Sera Xavier    Relationship: Self    Best call back number: [phone]    What is the best time to reach you: ANYTIME     Who are you requesting to speak with (clinical staff, provider,  specific staff member): CLINICAL     What was the call regarding:PATIENT WAS SEEN IN OFFICE ON Friday, PATIENT ONLY RECEIVED 5 PILLS moxifloxacin (Avelox) 400 MG tablet   PATIENT STILL HAS THE INFECTION AND STATES CARLOS MUÑOZ USALLY GIVES HER 10 PILLS, AND PATIENT WAS NEEDING THE OTHER 5 PILLS SENT TO PHARMACY.     [id number] - [place], [place] - [address] AT [facility] [phone] [facility] [phone] FX      Do you require a callback: YES

## 2022-11-02 ENCOUNTER — TELEPHONE (OUTPATIENT)
Dept: INTERNAL MEDICINE | Age: 42
End: 2022-11-02

## 2022-11-02 RX ORDER — FLUCONAZOLE 150 MG/1
150 TABLET ORAL DAILY
Qty: 2 TABLET | Refills: 0 | Status: SHIPPED | OUTPATIENT
Start: 2022-11-02 | End: 2022-12-05 | Stop reason: SDUPTHER

## 2022-11-02 NOTE — TELEPHONE ENCOUNTER
Caller: Sera Xavier    Relationship: Self    Best call back number: [phone]    What medication are you requesting: POSSIBLE YEAST INFECTION RELIEF    What are your current symptoms: ITCHINESS    How long have you been experiencing symptoms: Sunday, 10/30/22    Have you had these symptoms before:    [x] Yes  [] No    Have you been treated for these symptoms before:   [x] Yes  [] No    If a prescription is needed, what is your preferred pharmacy and phone number: [facility] #[id number] - [facility] [address] AT [facility] & [facility] [phone] [facility] [phone]      Additional notes: PATIENT STATES SHE HAS A POSSIBLE YEAST INFECTION AND IS REQUESTING A NEW MEDICATION TO HELP TREAT. PLEASE CALL AND ADVISE.

## 2022-12-05 ENCOUNTER — OFFICE VISIT (OUTPATIENT)
Dept: INTERNAL MEDICINE | Age: 42
End: 2022-12-05

## 2022-12-05 VITALS
HEIGHT: 67 IN | OXYGEN SATURATION: 100 % | WEIGHT: 169.4 LBS | HEART RATE: 96 BPM | DIASTOLIC BLOOD PRESSURE: 82 MMHG | SYSTOLIC BLOOD PRESSURE: 150 MMHG | TEMPERATURE: 97.1 F | BODY MASS INDEX: 26.59 KG/M2

## 2022-12-05 DIAGNOSIS — J01.91 ACUTE RECURRENT SINUSITIS, UNSPECIFIED LOCATION: ICD-10-CM

## 2022-12-05 DIAGNOSIS — R09.81 SINUS CONGESTION: Primary | ICD-10-CM

## 2022-12-05 LAB
EXPIRATION DATE: NORMAL
FLUAV AG UPPER RESP QL IA.RAPID: NOT DETECTED
FLUBV AG UPPER RESP QL IA.RAPID: NOT DETECTED
INTERNAL CONTROL: NORMAL
Lab: NORMAL
SARS-COV-2 AG UPPER RESP QL IA.RAPID: NOT DETECTED

## 2022-12-05 PROCEDURE — 96372 THER/PROPH/DIAG INJ SC/IM: CPT | Performed by: NURSE PRACTITIONER

## 2022-12-05 PROCEDURE — 87428 SARSCOV & INF VIR A&B AG IA: CPT | Performed by: NURSE PRACTITIONER

## 2022-12-05 PROCEDURE — 99213 OFFICE O/P EST LOW 20 MIN: CPT | Performed by: NURSE PRACTITIONER

## 2022-12-05 RX ORDER — MOXIFLOXACIN HYDROCHLORIDE 400 MG/1
400 TABLET ORAL DAILY
Qty: 5 TABLET | Refills: 0 | Status: SHIPPED | OUTPATIENT
Start: 2022-12-05 | End: 2022-12-10

## 2022-12-05 RX ORDER — METHYLPREDNISOLONE ACETATE 80 MG/ML
80 INJECTION, SUSPENSION INTRA-ARTICULAR; INTRALESIONAL; INTRAMUSCULAR; SOFT TISSUE ONCE
Status: COMPLETED | OUTPATIENT
Start: 2022-12-05 | End: 2022-12-05

## 2022-12-05 RX ORDER — FLUCONAZOLE 150 MG/1
150 TABLET ORAL DAILY
Qty: 4 TABLET | Refills: 0 | Status: SHIPPED | OUTPATIENT
Start: 2022-12-05 | End: 2023-01-26 | Stop reason: SDUPTHER

## 2022-12-05 RX ADMIN — METHYLPREDNISOLONE ACETATE 80 MG: 80 INJECTION, SUSPENSION INTRA-ARTICULAR; INTRALESIONAL; INTRAMUSCULAR; SOFT TISSUE at 13:33

## 2022-12-05 NOTE — PROGRESS NOTES
"    I N T E R N A L  M E D I C I N E  CARLOS MUÑOZ, APRN      ENCOUNTER DATE:  12/05/2022    Sera Xavier / 42 y.o. / female      CHIEF COMPLAINT / REASON FOR OFFICE VISIT     Sore Throat, Earache, Sinus Problem, and Fatigue      ASSESSMENT & PLAN     Problem List Items Addressed This Visit        ENT    Acute recurrent sinusitis    Sinus congestion - Primary     Orders Placed This Encounter   Procedures   • POCT SARS-CoV-2 Antigen CHEKO + Flu     New Medications Ordered This Visit   Medications   • methylPREDNISolone acetate (DEPO-medrol) injection 80 mg   • moxifloxacin (Avelox) 400 MG tablet     Sig: Take 1 tablet by mouth Daily for 5 days.     Dispense:  5 tablet     Refill:  0   • fluconazole (Diflucan) 150 MG tablet     Sig: Take 1 tablet by mouth Daily. Take 1 tablet now and 1 in 72 hours     Dispense:  4 tablet     Refill:  0       SUMMARY/DISCUSSION  • Follow-up if symptoms do not improve or worsen  • Continue OTC routine allergy regimen.   • Discontinue or significantly decreased Mucinex D with increased blood pressure today    Next Appointment with me: 4/24/2023    No follow-ups on file.      VITAL SIGNS     Visit Vitals  /82 (Cuff Size: Adult)   Pulse 96   Temp 97.1 °F (36.2 °C) (Temporal)   Ht 170.8 cm (67.24\")   Wt 76.8 kg (169 lb 6.4 oz)   SpO2 100%   BMI 26.34 kg/m²       Wt Readings from Last 3 Encounters:   12/05/22 76.8 kg (169 lb 6.4 oz)   10/21/22 76.2 kg (168 lb)   09/21/22 73.9 kg (163 lb)     Body mass index is 26.34 kg/m².      MEDICATIONS AT THE TIME OF OFFICE VISIT     Current Outpatient Medications on File Prior to Visit   Medication Sig   • acyclovir (ZOVIRAX) 400 MG tablet TAKE TWO TABLETS BY MOUTH THREE TIMES DAILY FOR TWO DAYS   • Ascorbic Acid (VITAMIN C ER PO) Take  by mouth.   • BIOTIN PO Take  by mouth.   • cetirizine (zyrTEC) 10 MG tablet Take 10 mg by mouth Daily.   • COLLAGEN PO Take  by mouth.   • Cyanocobalamin (VITAMIN B12 PO) Take  by mouth.   • " GLUCOSAMINE-CHONDROITIN PO Take  by mouth.   • ketotifen (ZADITOR) 0.025 % ophthalmic solution Administer 2 drops to both eyes Daily.   • levocetirizine (XYZAL) 5 MG tablet Take 5 mg by mouth Every Evening.   • Methylsulfonylmethane (MSM PO) Take  by mouth.   • Omega-3 Fatty Acids (FISH OIL) 1000 MG capsule capsule Take  by mouth Daily With Breakfast.   • omeprazole OTC (PRILOSEC OTC) 20 MG EC tablet Take 1 tablet by mouth Daily.   • pseudoephedrine-guaifenesin (MUCINEX D)  MG per 12 hr tablet Take 1 tablet by mouth Every 12 (Twelve) Hours.   • Triamcinolone Acetonide (NASACORT) 55 MCG/ACT nasal inhaler 2 sprays into the nostril(s) as directed by provider Daily.   • [DISCONTINUED] fluconazole (Diflucan) 150 MG tablet Take 1 tablet by mouth Daily. Take 1 tablet now and 1 in 72 hours   • mupirocin (Bactroban) 2 % cream Apply 1 application topically to the appropriate area as directed 2 (Two) Times a Day.     No current facility-administered medications on file prior to visit.          HISTORY OF PRESENT ILLNESS     Sera Xavier presents to the clinic today for an approximately 1-week earache, sore throat, sinus congestion, and fatigue.     Sinus congestion:  She states that it started out as a chest cold and she just had a severe cough. She would take cough syrup medicine to help with relief. She thought she was getting better until her throat became sore and she could hardly talk. She states that within the last 2 days it has now been affecting her sinuses. She states that this is the worst sinus infection she has had. She sees an allergist. She is currently taking Xyzal, Mucinex D, and a nasal spray.       REVIEW OF SYSTEMS     Constitutional neg except per HPI   ENT PND, sore throat, nasal congestion   Resp neg  CV neg    PHYSICAL EXAMINATION     Physical Exam  Constitutional  No distress  ENT nasal congestion, throat erythema, PND, sinus pain with palpation maxillary   Cardiovascular Rate  normal . Rhythm:  regular . Heart sounds:  normal  Pulmonary/Chest  Effort normal. Breath sounds:  normal  Psychiatric  Alert. Judgment and thought content normal. Mood normal     REVIEWED DATA     Labs:   - COVID-19 rapid   - influenza A/B rapid     Imaging:           Medical Tests:           Summary of old records / correspondence / consultant report:           Request outside records:             *Examiner was wearing medical surgical mask.     **Dragon dictation used for documentation.     Transcribed from ambient dictation for SOLO Briones by Joy Burns.  12/05/22   14:35 EST    Patient or patient representative verbalized consent to the visit recording.  I have personally performed the services described in this document as transcribed by the above individual, and it is both accurate and complete.

## 2022-12-12 ENCOUNTER — TELEPHONE (OUTPATIENT)
Dept: INTERNAL MEDICINE | Age: 42
End: 2022-12-12

## 2022-12-12 RX ORDER — MOXIFLOXACIN HYDROCHLORIDE 400 MG/1
400 TABLET ORAL DAILY
Qty: 3 TABLET | Refills: 0 | Status: SHIPPED | OUTPATIENT
Start: 2022-12-12 | End: 2022-12-15

## 2022-12-12 NOTE — TELEPHONE ENCOUNTER
JMTVM advising pt that extra 3 day supply sent to pharm. If cont. S/s pt will need updated eval. MM

## 2022-12-12 NOTE — TELEPHONE ENCOUNTER
Caller: Sera Xavier    Relationship: Self    Best call back number:   Requested Prescriptions:   Requested Prescriptions      No prescriptions requested or ordered in this encounter      MOXIFLOXICIN AVELOX 400MG    Pharmacy where request should be sent:  [facility]  [address] [place] [address]  [phone]    Additional details provided by patient:     Does the patient have less than a 3 day supply:  [x] Yes  [] No    Would you like a call back once the refill request has been completed: [x] Yes [] No    If the office needs to give you a call back, can they leave a voicemail: [x] Yes [] No    Luis Felipe Barillas Rep   12/12/22 08:02 EST

## 2022-12-14 DIAGNOSIS — B00.1 RECURRENT COLD SORES: ICD-10-CM

## 2022-12-14 RX ORDER — ACYCLOVIR 400 MG/1
TABLET ORAL
Qty: 30 TABLET | Refills: 1 | Status: SHIPPED | OUTPATIENT
Start: 2022-12-14 | End: 2023-03-15

## 2023-01-26 ENCOUNTER — OFFICE VISIT (OUTPATIENT)
Dept: INTERNAL MEDICINE | Age: 43
End: 2023-01-26
Payer: COMMERCIAL

## 2023-01-26 ENCOUNTER — TELEPHONE (OUTPATIENT)
Dept: INTERNAL MEDICINE | Age: 43
End: 2023-01-26

## 2023-01-26 VITALS
DIASTOLIC BLOOD PRESSURE: 76 MMHG | HEART RATE: 82 BPM | BODY MASS INDEX: 25.65 KG/M2 | HEIGHT: 67 IN | SYSTOLIC BLOOD PRESSURE: 134 MMHG | WEIGHT: 163.4 LBS | TEMPERATURE: 97.1 F | OXYGEN SATURATION: 99 %

## 2023-01-26 DIAGNOSIS — R05.9 COUGH, UNSPECIFIED TYPE: ICD-10-CM

## 2023-01-26 DIAGNOSIS — J01.00 ACUTE MAXILLARY SINUSITIS, RECURRENCE NOT SPECIFIED: Primary | ICD-10-CM

## 2023-01-26 PROCEDURE — 99213 OFFICE O/P EST LOW 20 MIN: CPT | Performed by: NURSE PRACTITIONER

## 2023-01-26 PROCEDURE — 87428 SARSCOV & INF VIR A&B AG IA: CPT | Performed by: NURSE PRACTITIONER

## 2023-01-26 RX ORDER — MOXIFLOXACIN HYDROCHLORIDE 400 MG/1
400 TABLET ORAL DAILY
Qty: 7 TABLET | Refills: 0 | Status: SHIPPED | OUTPATIENT
Start: 2023-01-26 | End: 2023-02-02

## 2023-01-26 RX ORDER — FLUCONAZOLE 150 MG/1
150 TABLET ORAL DAILY
Qty: 4 TABLET | Refills: 0 | Status: SHIPPED | OUTPATIENT
Start: 2023-01-26 | End: 2023-03-15

## 2023-01-26 NOTE — PROGRESS NOTES
"    I N T E R N A L  M E D I C I N E  CARLOS MUÑOZ, APRN      ENCOUNTER DATE:  01/26/2023    Sera Xavier / 42 y.o. / female      CHIEF COMPLAINT / REASON FOR OFFICE VISIT     Sinus Problem (R sided pain) and Cough (Productive in am; green x2-3 wks)      ASSESSMENT & PLAN     Problem List Items Addressed This Visit    None  Visit Diagnoses     Acute maxillary sinusitis, recurrence not specified    -  Primary    Relevant Medications    moxifloxacin (Avelox) 400 MG tablet    fluconazole (Diflucan) 150 MG tablet    Cough, unspecified type        Relevant Orders    POCT SARS-CoV-2 Antigen CHEKO + Flu (Completed)        Orders Placed This Encounter   Procedures   • POCT SARS-CoV-2 Antigen CHEKO + Flu     New Medications Ordered This Visit   Medications   • moxifloxacin (Avelox) 400 MG tablet     Sig: Take 1 tablet by mouth Daily for 7 days.     Dispense:  7 tablet     Refill:  0   • fluconazole (Diflucan) 150 MG tablet     Sig: Take 1 tablet by mouth Daily. Take 1 tablet now and 1 in 72 hours     Dispense:  4 tablet     Refill:  0     Acute maxillary sinusitis, recurrence not specified    -  Primary   The patient will start taking moxifloxacin 400 mg. She will start taking fluconazole 150 mg.          Cough, unspecified type       POCT SARS-CoV-2 Antigen CHEKO + Flu (Completed)       SUMMARY/DISCUSSION    Follow-up as scheduled, earlier if needed if no symptom improvement    Appointment with me: 4/24/2023    Return for Next scheduled follow up.      VITAL SIGNS     Visit Vitals  /76 (Cuff Size: Adult)   Pulse 82   Temp 97.1 °F (36.2 °C) (Temporal)   Ht 170.8 cm (67.24\")   Wt 74.1 kg (163 lb 6.4 oz)   SpO2 99%   BMI 25.41 kg/m²     Wt Readings from Last 3 Encounters:   01/26/23 74.1 kg (163 lb 6.4 oz)   12/05/22 76.8 kg (169 lb 6.4 oz)   10/21/22 76.2 kg (168 lb)     Body mass index is 25.41 kg/m².      MEDICATIONS AT THE TIME OF OFFICE VISIT     Current Outpatient Medications on File Prior to Visit   Medication Sig   • " Ascorbic Acid (VITAMIN C ER PO) Take  by mouth.   • B Complex Vitamins (VITAMIN B COMPLEX PO) Take  by mouth.   • cetirizine (zyrTEC) 10 MG tablet Take 10 mg by mouth Daily.   • COLLAGEN PO Take  by mouth.   • GLUCOSAMINE-CHONDROITIN PO Take  by mouth.   • ketotifen (ZADITOR) 0.025 % ophthalmic solution Administer 2 drops to both eyes Daily.   • levocetirizine (XYZAL) 5 MG tablet Take 5 mg by mouth Every Evening.   • Methylsulfonylmethane (MSM PO) Take  by mouth.   • Omega-3 Fatty Acids (FISH OIL) 1000 MG capsule capsule Take  by mouth Daily With Breakfast.   • omeprazole OTC (PRILOSEC OTC) 20 MG EC tablet Take 1 tablet by mouth Daily.   • pseudoephedrine-guaifenesin (MUCINEX D)  MG per 12 hr tablet Take 1 tablet by mouth Every 12 (Twelve) Hours.   • Triamcinolone Acetonide (NASACORT) 55 MCG/ACT nasal inhaler 2 sprays into the nostril(s) as directed by provider Daily.   • acyclovir (ZOVIRAX) 400 MG tablet TAKE 2 TABLETS BY MOUTH THREE TIMES DAILY FOR 2 DAYS   • BIOTIN PO Take  by mouth.   • mupirocin (Bactroban) 2 % cream Apply 1 application topically to the appropriate area as directed 2 (Two) Times a Day.   • [DISCONTINUED] Cyanocobalamin (VITAMIN B12 PO) Take  by mouth.   • [DISCONTINUED] fluconazole (Diflucan) 150 MG tablet Take 1 tablet by mouth Daily. Take 1 tablet now and 1 in 72 hours     No current facility-administered medications on file prior to visit.          HISTORY OF PRESENT ILLNESS     Ms. Xavier is a 42-year-old female who presents to the clinic for an acute sinus issue and cough.     The patient endorses that she experienced symptoms of a head cold a few years back. She notes that is still experiencing symptoms of maxillary drainage. She notes that she has symptoms of a postnasal drip and early morning congestion. The patient reports that she is taking pseudoephedrine to relieve her symptoms. She notes that she has experienced frequent sinus infections. She denies any symptoms of dyspnea or  wheezing.       REVIEW OF SYSTEMS     Constitutional neg except per HPI   ENT PND, sinus pain, ear pressure   Resp cough   CV neg    PHYSICAL EXAMINATION     Physical Exam  Constitutional  No distress  ENT PND, right maxillary pain with palpation, bilateral ear effusion without signs of infection   Cardiovascular Rate  normal . Rhythm: regular . Heart sounds:  normal  Pulmonary/Chest  Effort normal. Breath sounds:  normal  Psychiatric  Alert. Judgment and thought content normal. Mood normal     REVIEWED DATA     Labs:   Lab Results   Component Value Date    GLUCOSE 85 03/31/2022    BUN 14 03/31/2022    CREATININE 0.88 03/31/2022    EGFRIFNONA 70 11/06/2020    EGFRIFAFRI 84 11/06/2020    BCR 16 03/31/2022    K 4.4 03/31/2022    CO2 23 03/31/2022    CALCIUM 9.5 03/31/2022    PROTENTOTREF 6.8 03/31/2022    ALBUMIN 4.6 03/31/2022    LABIL2 2.1 03/31/2022    AST 27 03/31/2022    ALT 20 03/31/2022     - influenza A/B   - COVID-19 rapid     Imaging:           Medical Tests:           Summary of old records / correspondence / consultant report:           Request outside records:       Transcribed from ambient dictation for SOLO Briones by Farzana Schuler.  01/26/23   13:52 EST    Patient or patient representative verbalized consent to the visit recording.  I have personally performed the services described in this document as transcribed by the above individual, and it is both accurate and complete.          *Examiner was wearing medical surgical mask.    **Dragon dictation used for documentation.

## 2023-01-26 NOTE — TELEPHONE ENCOUNTER
Pt called and needs prescription for generic Diflucan called in. [facility] on [address] [phone].

## 2023-01-27 ENCOUNTER — TELEPHONE (OUTPATIENT)
Dept: INTERNAL MEDICINE | Age: 43
End: 2023-01-27
Payer: COMMERCIAL

## 2023-01-27 NOTE — TELEPHONE ENCOUNTER
Pt. Is checking to see if the Diflucan (Fluconazole) was ever called into [facility] for her. She says that she can not take the antibiotic without it.

## 2023-01-27 NOTE — TELEPHONE ENCOUNTER
Spoke with Dulce at [facility] who stated that they did have the Rx, but it had not been processed. Dulce stated pt would be able to pick this up later on this afternoon.   This information was passed to the pt who verbalized understanding. BRYCE

## 2023-03-15 DIAGNOSIS — J01.00 ACUTE MAXILLARY SINUSITIS, RECURRENCE NOT SPECIFIED: ICD-10-CM

## 2023-03-15 DIAGNOSIS — B00.1 RECURRENT COLD SORES: ICD-10-CM

## 2023-03-15 RX ORDER — FLUCONAZOLE 150 MG/1
TABLET ORAL
Qty: 4 TABLET | Refills: 0 | Status: SHIPPED | OUTPATIENT
Start: 2023-03-15

## 2023-03-15 RX ORDER — ACYCLOVIR 400 MG/1
TABLET ORAL
Qty: 30 TABLET | Refills: 1 | Status: SHIPPED | OUTPATIENT
Start: 2023-03-15

## 2023-03-20 ENCOUNTER — TELEPHONE (OUTPATIENT)
Dept: INTERNAL MEDICINE | Age: 43
End: 2023-03-20

## 2023-03-20 NOTE — TELEPHONE ENCOUNTER
Caller: Sera Xavier    Relationship: Self    Best call back number: [phone]    Who are you requesting to speak with (clinical staff, provider,  specific staff member): CARLOS MUÑOZ OR MA    What was the call regarding: PATIENT STATES THAT THE PRESCRIPTION FOR fluconazole (DIFLUCAN) 150 MG tablet DID NOT CONTAIN ENOUGH INFORMATION FOR THE DIRECTIONS ON THE MEDICATION, AND BECAUSE OF THAT, THE PHARMACY IS UNABLE TO FILL IT AT THIS TIME. THEY HAVE SENT MULTIPLE REQUESTS FOR AN UPDATE BUT HAVE NOT HEARD BACK.    [id number] - [place], [place] - [address] AT [address] [facility] [address] [facility] [phone] [facility] [phone] FX

## 2023-04-11 DIAGNOSIS — Z00.00 HEALTHCARE MAINTENANCE: Primary | ICD-10-CM

## 2023-05-05 ENCOUNTER — OFFICE VISIT (OUTPATIENT)
Dept: INTERNAL MEDICINE | Age: 43
End: 2023-05-05
Payer: COMMERCIAL

## 2023-05-05 VITALS
HEIGHT: 67 IN | SYSTOLIC BLOOD PRESSURE: 128 MMHG | BODY MASS INDEX: 25.08 KG/M2 | DIASTOLIC BLOOD PRESSURE: 70 MMHG | TEMPERATURE: 96.9 F | OXYGEN SATURATION: 97 % | WEIGHT: 159.8 LBS | HEART RATE: 92 BPM

## 2023-05-05 DIAGNOSIS — B37.31 VAGINAL YEAST INFECTION: ICD-10-CM

## 2023-05-05 DIAGNOSIS — R01.1 HEART MURMUR: ICD-10-CM

## 2023-05-05 DIAGNOSIS — J01.01 ACUTE RECURRENT MAXILLARY SINUSITIS: Primary | ICD-10-CM

## 2023-05-05 RX ORDER — MOXIFLOXACIN HYDROCHLORIDE 400 MG/1
400 TABLET ORAL DAILY
Qty: 5 TABLET | Refills: 0 | Status: SHIPPED | OUTPATIENT
Start: 2023-05-05

## 2023-05-05 RX ORDER — FLUCONAZOLE 150 MG/1
150 TABLET ORAL DAILY
Qty: 2 TABLET | Refills: 0 | Status: SHIPPED | OUTPATIENT
Start: 2023-05-05

## 2023-05-05 RX ORDER — OXYMETAZOLINE HYDROCHLORIDE 0.05 G/100ML
2 SPRAY NASAL 2 TIMES DAILY
COMMUNITY

## 2023-05-05 NOTE — PROGRESS NOTES
"    I N T E R N A L  M E D I C I N E  Suzanne Herrera, APRN    ENCOUNTER DATE:  05/05/2023    Sera Xavier / 43 y.o. / female      CHIEF COMPLAINT / REASON FOR OFFICE VISIT     SINUS PRESSURE      ASSESSMENT & PLAN     Diagnoses and all orders for this visit:    1. Acute recurrent maxillary sinusitis (Primary)  -     moxifloxacin (Avelox) 400 MG tablet; Take 1 tablet by mouth Daily.  Dispense: 5 tablet; Refill: 0    2. Vaginal yeast infection  -     fluconazole (DIFLUCAN) 150 MG tablet; Take 1 tablet by mouth Daily.  Dispense: 2 tablet; Refill: 0    3. Heart murmur  -     Adult Transthoracic Echo Complete W/ Cont if Necessary Per Protocol; Future         SUMMARY/DISCUSSION  • Will treat with moxifloxacin given pt's report of antibiotic resistance to other antibiotics.  Recommend saline nasal rinses and close follow up with her allergist.  Diflucan prescribed in the event of vaginal yeast infection.  • Murmur auscultated during today's visit.  Pt believes she may have prior history of murmur, but no recent ECHO has been performed.  Agreeable to update.  • She is aware to return for worsening or non improving symptoms.      Next Appointment with me: Visit date not found    Return in about 3 months (around 8/5/2023) for Annual physical.      VITAL SIGNS     Visit Vitals  /70 (Cuff Size: Adult)   Pulse 92   Temp 96.9 °F (36.1 °C) (Temporal)   Ht 170.8 cm (67.24\")   Wt 72.5 kg (159 lb 12.8 oz)   SpO2 97%   BMI 24.85 kg/m²             Wt Readings from Last 3 Encounters:   05/05/23 72.5 kg (159 lb 12.8 oz)   01/26/23 74.1 kg (163 lb 6.4 oz)   12/05/22 76.8 kg (169 lb 6.4 oz)     Body mass index is 24.85 kg/m².        MEDICATIONS AT THE TIME OF OFFICE VISIT     Current Outpatient Medications on File Prior to Visit   Medication Sig Dispense Refill   • Ascorbic Acid (VITAMIN C ER PO) Take  by mouth.     • B Complex Vitamins (VITAMIN B COMPLEX PO) Take  by mouth.     • BIOTIN PO Take  by mouth.     • cetirizine (zyrTEC) " 10 MG tablet Take 1 tablet by mouth Daily.     • COLLAGEN PO Take  by mouth.     • GLUCOSAMINE-CHONDROITIN PO Take  by mouth.     • ketotifen (ZADITOR) 0.025 % ophthalmic solution Administer 2 drops to both eyes Daily.     • levocetirizine (XYZAL) 5 MG tablet Take 1 tablet by mouth Every Evening.     • Methylsulfonylmethane (MSM PO) Take  by mouth.     • Omega-3 Fatty Acids (FISH OIL) 1000 MG capsule capsule Take  by mouth Daily With Breakfast.     • omeprazole OTC (PRILOSEC OTC) 20 MG EC tablet Take 1 tablet by mouth Daily.     • oxymetazoline (AFRIN) 0.05 % nasal spray 2 sprays into the nostril(s) as directed by provider 2 (Two) Times a Day.     • pseudoephedrine-guaifenesin (MUCINEX D)  MG per 12 hr tablet Take 1 tablet by mouth Every 12 (Twelve) Hours.     • acyclovir (ZOVIRAX) 400 MG tablet TAKE 2 TABLETS BY MOUTH THREE TIMES DAILY FOR 2 DAYS (Patient not taking: Reported on 5/5/2023) 30 tablet 1   • mupirocin (Bactroban) 2 % cream Apply 1 application topically to the appropriate area as directed 2 (Two) Times a Day. (Patient not taking: Reported on 5/5/2023) 15 g 0   • Triamcinolone Acetonide (NASACORT) 55 MCG/ACT nasal inhaler 2 sprays into the nostril(s) as directed by provider Daily. (Patient not taking: Reported on 5/5/2023)     • [DISCONTINUED] fluconazole (DIFLUCAN) 150 MG tablet TAKE 1 TABLET BY MOUTH NOW AND REPEAT 1 TABLET IN 72 HOURS (Patient not taking: Reported on 5/5/2023) 4 tablet 0     No current facility-administered medications on file prior to visit.        HISTORY OF PRESENT ILLNESS     Symptoms started on April 24 with head congestion, and right sinus maxillary pressure/ pain.  Associated symptoms include sore throat.  She has prior hx of frequent sinus infections.  No fever/ chills.  She has allergies to PCN/ sulfa, and has  treatment failure for sinusitis with prior antibiotic use.  She states Avelox is the only antibiotic that has worked for her sinus infections.      She is  followed by Dr. Burton, allergy specialist.       She has personal history of significant allergies and takes Zyrtec daily, Xyzal nightly, and Nasacort nasal spray.  Taking Mucinex D currently without benefit.  She did try a short course of Afrin during recent illness.      Heart murmur auscultated today.  She remains without chest pain, dyspnea, palpitations, exercise intolerance, lower extremity swelling.      Patient Care Team:  Danny Hilton APRN as PCP - General (Internal Medicine)    REVIEW OF SYSTEMS     Review of Systems   Constitutional: Positive for fatigue. Negative for chills, fever and unexpected weight change.   HENT: Positive for congestion, sinus pressure, sinus pain and sore throat.    Respiratory: Negative for cough, chest tightness and shortness of breath.    Cardiovascular: Negative for chest pain, palpitations and leg swelling.   Musculoskeletal: Negative for myalgias.   Neurological: Negative for dizziness, weakness, light-headedness and headaches.   Psychiatric/Behavioral: The patient is not nervous/anxious.           PHYSICAL EXAMINATION     Physical Exam  Vitals reviewed.   Constitutional:       General: She is not in acute distress.     Appearance: Normal appearance. She is not ill-appearing, toxic-appearing or diaphoretic.   HENT:      Head: Normocephalic and atraumatic.      Right Ear: Tympanic membrane, ear canal and external ear normal. There is no impacted cerumen.      Left Ear: Tympanic membrane, ear canal and external ear normal. There is no impacted cerumen.      Nose: Congestion present. No rhinorrhea.      Right Sinus: Maxillary sinus tenderness present. No frontal sinus tenderness.      Left Sinus: No maxillary sinus tenderness or frontal sinus tenderness.      Mouth/Throat:      Mouth: Mucous membranes are moist.      Pharynx: Oropharynx is clear. No oropharyngeal exudate or posterior oropharyngeal erythema.   Cardiovascular:      Rate and Rhythm: Normal rate and regular rhythm.       Heart sounds: Murmur heard.   Pulmonary:      Effort: Pulmonary effort is normal.      Breath sounds: Normal breath sounds.   Lymphadenopathy:      Cervical: No cervical adenopathy.   Neurological:      Mental Status: She is alert and oriented to person, place, and time. Mental status is at baseline.   Psychiatric:         Mood and Affect: Mood normal.         Behavior: Behavior normal.         Thought Content: Thought content normal.         Judgment: Judgment normal.           REVIEWED DATA     Labs:           Imaging:            Medical Tests:           Summary of old records / correspondence / consultant report:           Request outside records:

## 2023-05-10 ENCOUNTER — TELEPHONE (OUTPATIENT)
Dept: INTERNAL MEDICINE | Age: 43
End: 2023-05-10
Payer: COMMERCIAL

## 2023-05-10 DIAGNOSIS — J01.01 ACUTE RECURRENT MAXILLARY SINUSITIS: ICD-10-CM

## 2023-05-10 NOTE — TELEPHONE ENCOUNTER
Caller: Sera Xavier    Relationship: Self    Best call back number: [phone]    What is the best time to reach you: ANY     Who are you requesting to speak with (clinical staff, provider,  specific staff member): CLINICAL STAFF     What was the call regarding: SEEN Friday AND WAS ONLY GIVEN 5 PILLS OF THE AVELOX. PATIENT USUALLY GETS 7 AND WOULD LIKE TO KNOW IF SHE CAN HAVE 2 MORE DAY SUPPLY SINCE SHE IS STILL HAVING SYMPTOMS.     Do you require a callback: YES

## 2023-05-11 ENCOUNTER — OFFICE VISIT (OUTPATIENT)
Dept: INTERNAL MEDICINE | Age: 43
End: 2023-05-11
Payer: COMMERCIAL

## 2023-05-11 ENCOUNTER — TELEPHONE (OUTPATIENT)
Dept: INTERNAL MEDICINE | Age: 43
End: 2023-05-11
Payer: COMMERCIAL

## 2023-05-11 VITALS
OXYGEN SATURATION: 96 % | DIASTOLIC BLOOD PRESSURE: 70 MMHG | BODY MASS INDEX: 25.24 KG/M2 | TEMPERATURE: 97.3 F | HEART RATE: 79 BPM | WEIGHT: 160.8 LBS | HEIGHT: 67 IN | SYSTOLIC BLOOD PRESSURE: 122 MMHG

## 2023-05-11 DIAGNOSIS — J01.90 ACUTE SINUSITIS, RECURRENCE NOT SPECIFIED, UNSPECIFIED LOCATION: Primary | ICD-10-CM

## 2023-05-11 PROCEDURE — 99213 OFFICE O/P EST LOW 20 MIN: CPT | Performed by: NURSE PRACTITIONER

## 2023-05-11 RX ORDER — MOXIFLOXACIN HYDROCHLORIDE 400 MG/1
400 TABLET ORAL DAILY
Qty: 5 TABLET | Refills: 0 | Status: SHIPPED | OUTPATIENT
Start: 2023-05-11

## 2023-05-11 RX ORDER — FLUCONAZOLE 150 MG/1
150 TABLET ORAL ONCE
Qty: 2 TABLET | Refills: 0 | Status: SHIPPED | OUTPATIENT
Start: 2023-05-11 | End: 2023-05-11

## 2023-05-11 RX ORDER — MOXIFLOXACIN HYDROCHLORIDE 400 MG/1
400 TABLET ORAL DAILY
Qty: 2 TABLET | Refills: 0 | Status: SHIPPED | OUTPATIENT
Start: 2023-05-11 | End: 2023-05-11

## 2023-05-11 RX ORDER — METHYLPREDNISOLONE 4 MG/1
TABLET ORAL
Qty: 21 TABLET | Refills: 0 | Status: SHIPPED | OUTPATIENT
Start: 2023-05-11

## 2023-05-11 NOTE — PROGRESS NOTES
"    I N T E R N A L  M E D I C I N E  CARLOS MUÑOZ, APRVIDAL      ENCOUNTER DATE:  05/11/2023    Sera Xavier / 43 y.o. / female      CHIEF COMPLAINT / REASON FOR OFFICE VISIT     Sinus Problem      ASSESSMENT & PLAN     Problem List Items Addressed This Visit    None  Visit Diagnoses     Acute sinusitis, recurrence not specified, unspecified location    -  Primary    Relevant Medications    moxifloxacin (Avelox) 400 MG tablet    methylPREDNISolone (MEDROL) 4 MG dose pack        No orders of the defined types were placed in this encounter.    New Medications Ordered This Visit   Medications   • moxifloxacin (Avelox) 400 MG tablet     Sig: Take 1 tablet by mouth Daily.     Dispense:  5 tablet     Refill:  0   • methylPREDNISolone (MEDROL) 4 MG dose pack     Sig: Take as directed on package instructions.     Dispense:  21 tablet     Refill:  0   • fluconazole (Diflucan) 150 MG tablet     Sig: Take 1 tablet by mouth 1 (One) Time for 1 dose. Take one tablet now and one in 72 hours     Dispense:  2 tablet     Refill:  0       SUMMARY/DISCUSSION  • We will go ahead and give her a course of 5 additional days of antibiotic along with Medrol pack for significant pressure and pain.  Unfortunately our only option continues to be for quinolone as she states that no other antibiotics will work for her with previous use.  Discussed potential for increased tendon rupture with Medrol pack and fluoroquinolone which she understands the risk and wishes to proceed.    Next Appointment with me: 8/1/2023    No follow-ups on file.      VITAL SIGNS     Visit Vitals  /70 (Cuff Size: Adult)   Pulse 79   Temp 97.3 °F (36.3 °C) (Temporal)   Ht 170.8 cm (67.24\")   Wt 72.9 kg (160 lb 12.8 oz)   SpO2 96%   BMI 25.01 kg/m²     Wt Readings from Last 3 Encounters:   05/11/23 72.9 kg (160 lb 12.8 oz)   05/05/23 72.5 kg (159 lb 12.8 oz)   01/26/23 74.1 kg (163 lb 6.4 oz)     Body mass index is 25.01 kg/m².      MEDICATIONS AT THE TIME OF OFFICE " VISIT     Current Outpatient Medications on File Prior to Visit   Medication Sig   • acyclovir (ZOVIRAX) 400 MG tablet TAKE 2 TABLETS BY MOUTH THREE TIMES DAILY FOR 2 DAYS   • Ascorbic Acid (VITAMIN C ER PO) Take  by mouth.   • B Complex Vitamins (VITAMIN B COMPLEX PO) Take  by mouth.   • BIOTIN PO Take  by mouth.   • cetirizine (zyrTEC) 10 MG tablet Take 1 tablet by mouth Daily.   • COLLAGEN PO Take  by mouth.   • fluconazole (DIFLUCAN) 150 MG tablet Take 1 tablet by mouth Daily.   • GLUCOSAMINE-CHONDROITIN PO Take  by mouth.   • ketotifen (ZADITOR) 0.025 % ophthalmic solution Administer 2 drops to both eyes Daily.   • levocetirizine (XYZAL) 5 MG tablet Take 1 tablet by mouth Every Evening.   • Methylsulfonylmethane (MSM PO) Take  by mouth.   • mupirocin (Bactroban) 2 % cream Apply 1 application topically to the appropriate area as directed 2 (Two) Times a Day.   • Omega-3 Fatty Acids (FISH OIL) 1000 MG capsule capsule Take  by mouth Daily With Breakfast.   • omeprazole OTC (PRILOSEC OTC) 20 MG EC tablet Take 1 tablet by mouth Daily.   • oxymetazoline (AFRIN) 0.05 % nasal spray 2 sprays into the nostril(s) as directed by provider 2 (Two) Times a Day.   • pseudoephedrine-guaifenesin (MUCINEX D)  MG per 12 hr tablet Take 1 tablet by mouth Every 12 (Twelve) Hours.   • Triamcinolone Acetonide (NASACORT) 55 MCG/ACT nasal inhaler 2 sprays into the nostril(s) as directed by provider Daily.   • [DISCONTINUED] moxifloxacin (Avelox) 400 MG tablet Take 1 tablet by mouth Daily.   • [DISCONTINUED] moxifloxacin (Avelox) 400 MG tablet Take 1 tablet by mouth Daily.     No current facility-administered medications on file prior to visit.          HISTORY OF PRESENT ILLNESS     Patient presents today post 5 days of Avelox for treatment of acute sinusitis.  Symptoms are resolving but she started to use Afrin and now she is having significant rebound congestion, maxillary pressure and bilateral ear pressure.  She is using  Nasacort with minimal  Improvement.    REVIEW OF SYSTEMS     Constitutional neg except per HPI   ENT sinus congestion, pain pressure, PND  Resp cough   CV neg    PHYSICAL EXAMINATION     Physical Exam  Constitutional  No distress  ENT congestion, right maxillary pain with palpation  Cardiovascular Rate  normal . Rhythm: regular .   Pulmonary/Chest  Effort normal. Breath sounds:  normal  Psychiatric  Alert. Judgment and thought content normal. Mood normal     REVIEWED DATA     Labs:   Lab Results   Component Value Date    GLUCOSE 85 03/31/2022    BUN 14 03/31/2022    CREATININE 0.88 03/31/2022    EGFRRESULT 84 03/31/2022    BCR 16 03/31/2022    K 4.4 03/31/2022    CO2 23 03/31/2022    CALCIUM 9.5 03/31/2022    PROTENTOTREF 6.8 03/31/2022    ALBUMIN 4.6 03/31/2022    BILITOT 1.0 03/31/2022    AST 27 03/31/2022    ALT 20 03/31/2022     Imaging:           Medical Tests:           Summary of old records / correspondence / consultant report:           Request outside records:           **Dragon dictation used for documentation.

## 2023-05-11 NOTE — TELEPHONE ENCOUNTER
Discussed with patient's PCP, Danny BANDA.  Recommend that since patient's symptoms are now worse after taking antibiotic, it is important that she be re evaluated.

## 2023-05-11 NOTE — TELEPHONE ENCOUNTER
Recommend that pt be re evaluated since she is having new/ worsening symptoms.  Either by us or by urgent care.  Thank you.

## 2023-05-11 NOTE — TELEPHONE ENCOUNTER
Caller: Sera Xavier    Relationship: Self    Best call back number:[phone]    Who are you requesting to speak with (clinical staff, provider,  specific staff member):CLINICAL    What was the call regarding:PATIENT GOT CALLED IN ADDITIONAL MEDICATION, BUT THIS TIME SHE IS FEELING WORSE WITH HER SORE THROAT, CONGESTION,A DN COUGH. PATIENT IS WANTING TO KNOW IF STEROIDS WOULD HELP, OR WHAT WOULD THE BEST SOLUTION BE.     Do you require a callback:YES

## 2023-05-11 NOTE — TELEPHONE ENCOUNTER
Pt declines appts at this time in office and UC. Pt states she does not have the money to keep coming to office. Pt would like the steroid at this time.

## 2023-05-20 DIAGNOSIS — B00.1 RECURRENT COLD SORES: ICD-10-CM

## 2023-05-22 RX ORDER — ACYCLOVIR 400 MG/1
TABLET ORAL
Qty: 30 TABLET | Refills: 0 | Status: SHIPPED | OUTPATIENT
Start: 2023-05-22

## 2023-06-05 ENCOUNTER — TELEPHONE (OUTPATIENT)
Dept: INTERNAL MEDICINE | Age: 43
End: 2023-06-05

## 2023-06-05 DIAGNOSIS — B00.1 RECURRENT COLD SORES: ICD-10-CM

## 2023-06-05 RX ORDER — ACYCLOVIR 400 MG/1
TABLET ORAL
Qty: 30 TABLET | Refills: 0 | Status: CANCELLED | OUTPATIENT
Start: 2023-06-05

## 2023-06-05 RX ORDER — ACYCLOVIR 400 MG/1
TABLET ORAL
Qty: 30 TABLET | Refills: 11 | Status: SHIPPED | OUTPATIENT
Start: 2023-06-05

## 2023-06-05 NOTE — TELEPHONE ENCOUNTER
PATIENT IS REQUESTING TO HAVE REFILLS ON THIS MEDICATION SO SHE DOESN'T HAVE TO CALL EACH TIME     Caller: Sera Xavier    Relationship: Self    Best call back number:     What medication are you requesting:  acyclovir (ZOVIRAX) 400 MG tablet   0 ordered          Summary: TAKE 2 TABLETS BY MOUTH THREE TIMES DAILY FOR 2 DAYS          What are your current symptoms: FEVER BLISTER/ COLD SORE    How long have you been experiencing symptoms:     Have you had these symptoms before:    [x] Yes  [] No    Have you been treated for these symptoms before:   [x] Yes  [] No    If a prescription is needed, what is your preferred pharmacy and phone number: [id number] [address] AT [facility] [phone] [facility] [phone] FX     Additional notes:

## 2023-08-31 ENCOUNTER — TELEPHONE (OUTPATIENT)
Dept: INTERNAL MEDICINE | Age: 43
End: 2023-08-31
Payer: COMMERCIAL

## 2023-08-31 NOTE — TELEPHONE ENCOUNTER
Face pain /sinus pressure , fluid right ear , cough ,no fever nasal drip . X over 1 week   Did not do a covid test . She say it is not covid    Refuse to go [facility] . Said send a message to geovanny she know which antibiotic (moxifloxacin (Avelox) 400 MG )she take . Nothing else work for her . And [facility] do not give it       She said took mucenix and xyzal and nasal spray

## 2023-09-01 ENCOUNTER — OFFICE VISIT (OUTPATIENT)
Dept: INTERNAL MEDICINE | Age: 43
End: 2023-09-01
Payer: COMMERCIAL

## 2023-09-01 VITALS
OXYGEN SATURATION: 96 % | BODY MASS INDEX: 24.33 KG/M2 | HEART RATE: 81 BPM | SYSTOLIC BLOOD PRESSURE: 120 MMHG | WEIGHT: 155 LBS | HEIGHT: 67 IN | TEMPERATURE: 97.7 F | DIASTOLIC BLOOD PRESSURE: 72 MMHG

## 2023-09-01 DIAGNOSIS — J01.90 ACUTE SINUSITIS, RECURRENCE NOT SPECIFIED, UNSPECIFIED LOCATION: ICD-10-CM

## 2023-09-01 PROCEDURE — 99213 OFFICE O/P EST LOW 20 MIN: CPT | Performed by: NURSE PRACTITIONER

## 2023-09-01 RX ORDER — MOXIFLOXACIN HYDROCHLORIDE 400 MG/1
400 TABLET ORAL DAILY
Qty: 5 TABLET | Refills: 0 | Status: SHIPPED | OUTPATIENT
Start: 2023-09-01

## 2023-09-01 RX ORDER — FLUCONAZOLE 150 MG/1
TABLET ORAL
Qty: 2 TABLET | Refills: 0 | Status: SHIPPED | OUTPATIENT
Start: 2023-09-01

## 2023-09-01 NOTE — PROGRESS NOTES
"    I N T E R N A L  M E D I C I N E  CARLOS MUÑOZ, APRN      ENCOUNTER DATE:  09/01/2023    Sera Xavier / 43 y.o. / female      CHIEF COMPLAINT / REASON FOR OFFICE VISIT     sinus pressure      ASSESSMENT & PLAN     Problem List Items Addressed This Visit    None  Visit Diagnoses       Acute sinusitis, recurrence not specified, unspecified location        Relevant Medications    moxifloxacin (Avelox) 400 MG tablet    fluconazole (Diflucan) 150 MG tablet          No orders of the defined types were placed in this encounter.    New Medications Ordered This Visit   Medications    moxifloxacin (Avelox) 400 MG tablet     Sig: Take 1 tablet by mouth Daily.     Dispense:  5 tablet     Refill:  0    fluconazole (Diflucan) 150 MG tablet     Sig: Take one tablet now and one in 72 hours     Dispense:  2 tablet     Refill:  0       SUMMARY/DISCUSSION  Patient will continue all over-the-counter sinus medications.  Follow-up if no improvement in symptoms  Unfortunately our only option continues to be for quinolone as she states that no other antibiotics will work for her with previous use.     Next Appointment with me: Visit date not found    No follow-ups on file.      VITAL SIGNS     Visit Vitals  /72   Pulse 81   Temp 97.7 øF (36.5 øC) (Temporal)   Ht 170.8 cm (67.24\")   Wt 70.3 kg (155 lb)   SpO2 96%   BMI 24.10 kg/mý       Wt Readings from Last 3 Encounters:   09/01/23 70.3 kg (155 lb)   05/11/23 72.9 kg (160 lb 12.8 oz)   05/05/23 72.5 kg (159 lb 12.8 oz)     Body mass index is 24.1 kg/mý.      MEDICATIONS AT THE TIME OF OFFICE VISIT     Current Outpatient Medications on File Prior to Visit   Medication Sig    acyclovir (ZOVIRAX) 400 MG tablet Take no more than 5 doses a day.    Ascorbic Acid (VITAMIN C ER PO) Take  by mouth.    B Complex Vitamins (VITAMIN B COMPLEX PO) Take  by mouth.    BIOTIN PO Take  by mouth.    cetirizine (zyrTEC) 10 MG tablet Take 1 tablet by mouth Daily.    COLLAGEN PO Take  by mouth.    " GLUCOSAMINE-CHONDROITIN PO Take  by mouth.    ketotifen (ZADITOR) 0.025 % ophthalmic solution Administer 2 drops to both eyes Daily.    levocetirizine (XYZAL) 5 MG tablet Take 1 tablet by mouth Every Evening.    Methylsulfonylmethane (MSM PO) Take  by mouth.    Omega-3 Fatty Acids (FISH OIL) 1000 MG capsule capsule Take  by mouth Daily With Breakfast.    omeprazole OTC (PRILOSEC OTC) 20 MG EC tablet Take 1 tablet by mouth Daily.    pseudoephedrine-guaifenesin (MUCINEX D)  MG per 12 hr tablet Take 1 tablet by mouth Every 12 (Twelve) Hours.    Triamcinolone Acetonide (NASACORT) 55 MCG/ACT nasal inhaler 2 sprays into the nostril(s) as directed by provider Daily.    fluconazole (DIFLUCAN) 150 MG tablet Take 1 tablet by mouth Daily. (Patient not taking: Reported on 9/1/2023)    mupirocin (Bactroban) 2 % cream Apply 1 application topically to the appropriate area as directed 2 (Two) Times a Day. (Patient not taking: Reported on 9/1/2023)    [DISCONTINUED] methylPREDNISolone (MEDROL) 4 MG dose pack Take as directed on package instructions.    [DISCONTINUED] moxifloxacin (Avelox) 400 MG tablet Take 1 tablet by mouth Daily.    [DISCONTINUED] oxymetazoline (AFRIN) 0.05 % nasal spray 2 sprays into the nostril(s) as directed by provider 2 (Two) Times a Day.     No current facility-administered medications on file prior to visit.          HISTORY OF PRESENT ILLNESS     Patient presents with approximately 1 week symptoms of sinus pressure and pain especially in the maxillary and ethmoid region, sinus congestion and postnasal drip.  No significant shortness of breath or wheezing.  No fever chills or malaise.    REVIEW OF SYSTEMS     Constitutional neg except per HPI   ENT sinus congestion, pain pressure, PND  Resp cough   CV neg    PHYSICAL EXAMINATION     Physical Exam  Constitutional  No distress  ENT congestion, maxillary pain with palpation  Cardiovascular Rate  normal . Rhythm: regular .   Pulmonary/Chest  Effort  normal. Breath sounds:  normal  Psychiatric  Alert. Judgment and thought content normal. Mood normal     REVIEWED DATA     Labs:   Lab Results   Component Value Date    GLUCOSE 85 03/31/2022    BUN 14 03/31/2022    CREATININE 0.88 03/31/2022    EGFRRESULT 84 03/31/2022    BCR 16 03/31/2022    K 4.4 03/31/2022    CO2 23 03/31/2022    CALCIUM 9.5 03/31/2022    PROTENTOTREF 6.8 03/31/2022    ALBUMIN 4.6 03/31/2022    BILITOT 1.0 03/31/2022    AST 27 03/31/2022    ALT 20 03/31/2022             Imaging:           Medical Tests:           Summary of old records / correspondence / consultant report:           Request outside records:             *Dragon dictation used for documentation.

## 2023-09-14 ENCOUNTER — TELEPHONE (OUTPATIENT)
Dept: INTERNAL MEDICINE | Age: 43
End: 2023-09-14
Payer: COMMERCIAL

## 2023-09-14 DIAGNOSIS — J01.90 ACUTE SINUSITIS, RECURRENCE NOT SPECIFIED, UNSPECIFIED LOCATION: ICD-10-CM

## 2023-09-14 RX ORDER — MOXIFLOXACIN HYDROCHLORIDE 400 MG/1
400 TABLET ORAL DAILY
Qty: 5 TABLET | Refills: 0 | Status: SHIPPED | OUTPATIENT
Start: 2023-09-14

## 2023-09-14 NOTE — TELEPHONE ENCOUNTER
Caller: Sera Xavier    Relationship: Self    Best call back number: [phone]    What medication are you requesting: ANTIBIOTICS    What are your current symptoms: PAIN IN RIGHT SIDE OF FACE, LYMPH NODE SWOLLEN    Have you had these symptoms before:    [x] Yes  [] No    Have you been treated for these symptoms before:   [x] Yes  [] No    If a prescription is needed, what is your preferred pharmacy and phone number: [id number] [place] [id number] [facility] AT [facility] [phone] [facility] [phone]      Additional notes: PATIENT STATED SHE FEELS LIKE HER INFECTION IS BACK, AND WOULD LIKE TO REQUEST MORE ANTIBIOTICS.    PLEASE CALL.

## 2023-09-19 ENCOUNTER — TELEPHONE (OUTPATIENT)
Dept: INTERNAL MEDICINE | Age: 43
End: 2023-09-19

## 2023-09-19 DIAGNOSIS — J34.89 SINUS PRESSURE: Primary | ICD-10-CM

## 2023-09-19 RX ORDER — METHYLPREDNISOLONE 4 MG/1
TABLET ORAL
Qty: 21 TABLET | Refills: 0 | Status: SHIPPED | OUTPATIENT
Start: 2023-09-19

## 2023-09-19 NOTE — TELEPHONE ENCOUNTER
Caller: Sera Xavier    Relationship: Self    Best call back number: [phone]     What medication are you requesting: PREDISONE    What are your current symptoms: SINUS ARE CLOGGING UP AND USING A LOT OF NASACORT      Have you had these symptoms before:    [x] Yes  [] No    Have you been treated for these symptoms before:   [x] Yes  [] No    If a prescription is needed, what is your preferred pharmacy and phone number:  [id number] [address] AT [facility] [phone] [facility] [phone] FX     Additional notes:  SHE STATES SHE JUST FINISHED WITH ANTIBIOTIC YESTERDAY. SHE THINKS SHE IS HAVING A REBOUND EFFECT FROM THE NASACORT . HER NOSE KEEPS GETTING STUFFY

## 2023-10-09 ENCOUNTER — OFFICE VISIT (OUTPATIENT)
Dept: INTERNAL MEDICINE | Age: 43
End: 2023-10-09
Payer: COMMERCIAL

## 2023-10-09 ENCOUNTER — HOSPITAL ENCOUNTER (OUTPATIENT)
Facility: HOSPITAL | Age: 43
Discharge: HOME | End: 2023-10-09
Payer: COMMERCIAL

## 2023-10-09 VITALS
SYSTOLIC BLOOD PRESSURE: 134 MMHG | WEIGHT: 157 LBS | OXYGEN SATURATION: 98 % | DIASTOLIC BLOOD PRESSURE: 70 MMHG | TEMPERATURE: 96.9 F | BODY MASS INDEX: 24.64 KG/M2 | HEART RATE: 92 BPM | HEIGHT: 67 IN

## 2023-10-09 DIAGNOSIS — R05.1 ACUTE COUGH: ICD-10-CM

## 2023-10-09 DIAGNOSIS — J32.9 RECURRENT SINUSITIS: ICD-10-CM

## 2023-10-09 DIAGNOSIS — J30.1 ALLERGIC RHINITIS DUE TO POLLEN, UNSPECIFIED SEASONALITY: Primary | ICD-10-CM

## 2023-10-09 PROCEDURE — 71046 X-RAY EXAM CHEST 2 VIEWS: CPT

## 2023-10-09 PROCEDURE — 99214 OFFICE O/P EST MOD 30 MIN: CPT

## 2023-10-09 RX ORDER — ALBUTEROL SULFATE 90 UG/1
2 AEROSOL, METERED RESPIRATORY (INHALATION) EVERY 4 HOURS PRN
Qty: 18 G | Refills: 0 | Status: SHIPPED | OUTPATIENT
Start: 2023-10-09

## 2023-10-09 RX ORDER — AZELASTINE 1 MG/ML
2 SPRAY, METERED NASAL 2 TIMES DAILY
Qty: 30 ML | Refills: 0 | Status: SHIPPED | OUTPATIENT
Start: 2023-10-09

## 2023-10-09 NOTE — PROGRESS NOTES
"    I N T E R N A L  M E D I C I N E  Suzanne Herrera, APRN    ENCOUNTER DATE:  10/09/2023    Sera Xavier / 43 y.o. / female      CHIEF COMPLAINT / REASON FOR OFFICE VISIT     Cough and URI      ASSESSMENT & PLAN     Diagnoses and all orders for this visit:    1. Allergic rhinitis due to pollen, unspecified seasonality (Primary)  -     azelastine (ASTELIN) 0.1 % nasal spray; 2 sprays into the nostril(s) as directed by provider 2 (Two) Times a Day. Use in each nostril as directed  Dispense: 30 mL; Refill: 0    2. Acute cough  -     XR Chest 2 View  -     albuterol sulfate  (90 Base) MCG/ACT inhaler; Inhale 2 puffs Every 4 (Four) Hours As Needed for Wheezing or Shortness of Air.  Dispense: 18 g; Refill: 0    3. Recurrent sinusitis  -     Ambulatory Referral to ENT (Otolaryngology)         SUMMARY/DISCUSSION  Recommended to avoid OTC decongestants as these can cause rebound congestion symptoms and raise blood pressure.  She will switch to Mucinex DM to help with cough control.  Declines to use saline nasal rinses.  Continue oral antihistamine regime and add Azelastine.  Will add albuterol inhaler for any episodes of shortness of breath, wheezing.  Will obtain Chest XR per patient's request to rule out PNA.  Discussed with patient extreme importance of following up with ENT given her history of recurrent sinusitis.  Return for non improving symptoms.  Visit ER for acutely worsening symptoms.        Next Appointment with me: Visit date not found    Return for Needs annual physical with PCP.      VITAL SIGNS     Visit Vitals  /70   Pulse 92   Temp 96.9 øF (36.1 øC)   Ht 170.8 cm (67.24\")   Wt 71.2 kg (157 lb)   SpO2 98%   BMI 24.41 kg/mý             Wt Readings from Last 3 Encounters:   10/09/23 71.2 kg (157 lb)   09/01/23 70.3 kg (155 lb)   05/11/23 72.9 kg (160 lb 12.8 oz)     Body mass index is 24.41 kg/mý.        MEDICATIONS AT THE TIME OF OFFICE VISIT     Current Outpatient Medications on File Prior to " Visit   Medication Sig Dispense Refill    acyclovir (ZOVIRAX) 400 MG tablet Take no more than 5 doses a day. 30 tablet 11    Ascorbic Acid (VITAMIN C ER PO) Take  by mouth.      B Complex Vitamins (VITAMIN B COMPLEX PO) Take  by mouth.      BIOTIN PO Take  by mouth.      cetirizine (zyrTEC) 10 MG tablet Take 1 tablet by mouth Daily.      COLLAGEN PO Take  by mouth.      fluconazole (DIFLUCAN) 150 MG tablet Take 1 tablet by mouth Daily. 2 tablet 0    fluconazole (Diflucan) 150 MG tablet Take one tablet now and one in 72 hours 2 tablet 0    GLUCOSAMINE-CHONDROITIN PO Take  by mouth.      ketotifen (ZADITOR) 0.025 % ophthalmic solution Administer 2 drops to both eyes Daily.      levocetirizine (XYZAL) 5 MG tablet Take 1 tablet by mouth Every Evening.      methylPREDNISolone (MEDROL) 4 MG dose pack Take as directed on package instructions. 21 tablet 0    Methylsulfonylmethane (MSM PO) Take  by mouth.      mupirocin (Bactroban) 2 % cream Apply 1 application topically to the appropriate area as directed 2 (Two) Times a Day. 15 g 0    Omega-3 Fatty Acids (FISH OIL) 1000 MG capsule capsule Take  by mouth Daily With Breakfast.      omeprazole OTC (PRILOSEC OTC) 20 MG EC tablet Take 1 tablet by mouth Daily.      pseudoephedrine-guaifenesin (MUCINEX D)  MG per 12 hr tablet Take 1 tablet by mouth Every 12 (Twelve) Hours.      Triamcinolone Acetonide (NASACORT) 55 MCG/ACT nasal inhaler 2 sprays into the nostril(s) as directed by provider Daily.       No current facility-administered medications on file prior to visit.        HISTORY OF PRESENT ILLNESS     She was seen in our office on September 1, 2023 and treated for acute sinusitis with moxifloxacin x 5 days.  Followed by completion of steroid dose pack, prescribed September 19, 2023.  She reports symptoms in her sinusitis symptoms after antibiotic/ steroid dose pack.  Medical history significant for recurrent sinusitis, reportedly requiring moxifloxacin treatment, as  this is the only antibiotic that is effective.  Reports a baseline sensation of right sided nasal congestion.  In the last week, she has developed significant post nasal drip along with non productive cough.  Some upper chest tightness, but no wheezing, dyspnea, chest pain.  No significant pulmonology history.  No fever, chills.  Temperature of 99 at home.  She is taking Mucinex D with benefit.  Daughter recently diagnosed with bronchitis.  She remains on Zyrtec, Xyzal, Nasacort.        Patient Care Team:  Danny Hilton APRN as PCP - General (Internal Medicine)    REVIEW OF SYSTEMS     Review of Systems   Constitutional:  Negative for chills, fever and unexpected weight change.   HENT:  Positive for congestion, postnasal drip and sinus pressure (Right, chronic). Negative for ear pain, sinus pain and sore throat.    Respiratory:  Positive for cough. Negative for chest tightness and shortness of breath.    Cardiovascular:  Negative for chest pain, palpitations and leg swelling.   Neurological:  Negative for dizziness, weakness, light-headedness and headaches.   Psychiatric/Behavioral:  The patient is not nervous/anxious.           PHYSICAL EXAMINATION     Physical Exam  Vitals reviewed.   Constitutional:       General: She is not in acute distress.     Appearance: Normal appearance. She is not ill-appearing, toxic-appearing or diaphoretic.   HENT:      Head: Normocephalic and atraumatic.      Right Ear: Tympanic membrane, ear canal and external ear normal. There is no impacted cerumen.      Left Ear: Tympanic membrane, ear canal and external ear normal. There is no impacted cerumen.      Nose: Nose normal. Rhinorrhea present. No congestion.      Right Sinus: No maxillary sinus tenderness or frontal sinus tenderness.      Left Sinus: No maxillary sinus tenderness or frontal sinus tenderness.      Mouth/Throat:      Mouth: Mucous membranes are moist.      Pharynx: Oropharynx is clear. No oropharyngeal exudate or  posterior oropharyngeal erythema.   Cardiovascular:      Rate and Rhythm: Normal rate and regular rhythm.      Heart sounds: Normal heart sounds.   Pulmonary:      Effort: Pulmonary effort is normal.      Breath sounds: Normal breath sounds.   Lymphadenopathy:      Cervical: No cervical adenopathy.   Neurological:      Mental Status: She is alert and oriented to person, place, and time. Mental status is at baseline.   Psychiatric:         Mood and Affect: Mood normal.         Behavior: Behavior normal.         Thought Content: Thought content normal.         Judgment: Judgment normal.           REVIEWED DATA     Labs:           Imaging:            Medical Tests:           Summary of old records / correspondence / consultant report:           Request outside records:

## 2023-10-10 ENCOUNTER — TELEPHONE (OUTPATIENT)
Dept: INTERNAL MEDICINE | Age: 43
End: 2023-10-10
Payer: COMMERCIAL

## 2023-10-10 DIAGNOSIS — J20.8 ACUTE BACTERIAL BRONCHITIS: Primary | ICD-10-CM

## 2023-10-10 DIAGNOSIS — B96.89 ACUTE BACTERIAL BRONCHITIS: Primary | ICD-10-CM

## 2023-10-10 RX ORDER — DOXYCYCLINE HYCLATE 100 MG/1
100 CAPSULE ORAL 2 TIMES DAILY
Qty: 14 CAPSULE | Refills: 0 | Status: SHIPPED | OUTPATIENT
Start: 2023-10-10 | End: 2023-10-17

## 2023-10-10 RX ORDER — BENZONATATE 200 MG/1
200 CAPSULE ORAL 3 TIMES DAILY PRN
Qty: 30 CAPSULE | Refills: 0 | Status: SHIPPED | OUTPATIENT
Start: 2023-10-10

## 2023-10-10 NOTE — TELEPHONE ENCOUNTER
Please call patient.    Inappropriate use of antibiotics can lead to significant health complications, including death.  At this time, if we suspect bacterial bronchitis treatment would be recommended with either Z pack or doxycycline, as long as she doesn't have any allergies to either.  Recommend that she is taking Mucinex DM for cough.

## 2023-10-10 NOTE — TELEPHONE ENCOUNTER
Pt returning call from alec. Said she received message on mychart and she advised she didn't want to be on zpack as she hasn't taken it in 20 years. She wanted to be on the strongest antibiotic they have listed dicyclomine

## 2023-10-10 NOTE — TELEPHONE ENCOUNTER
Please call patient.    For a bacterial bronchitis infection with worsening cough, would recommend either a Z pack or doxycyline, since she is penicillin allergic.  Please let me know if she is agreeable for RX.  Is she taking Mucinex DM to help control the cough?  We are still waiting on Chest XR results.

## 2023-10-10 NOTE — TELEPHONE ENCOUNTER
I personally called patient to review recent Chest XR results.      She now reports a productive cough, concerning for possible bacterial bronchitis.  No fever, chills, chest pain, shortness of breath.  She is using Mucinex DM with some benefit.  Discussed with patient extreme dangers of continued fluoroquinolone antibiotic use, including neurological, GI and cardiac side effects.  At this time, she is agreeable to use doxycycline.  She denies any allergy to this antibiotic.  She will monitor her symptoms closely and contact our office if no improvement.  She is aware to visit ER for any acutely worsening symptoms.

## 2023-10-10 NOTE — TELEPHONE ENCOUNTER
Caller: Sera Xavier    Relationship to patient: Self    Best call back number: [phone]    Patient is needing: WAS SEEN YESTERDAY 10/09/23-WAS NOT GIVEN ANTIBIOTICS OR COUGH MEDICATION. HER COUGH IS NOW WORSE AND SHE IS COUGHING UP YELLOW MUCUS, WOULD LIKE TO KNOW IF SHE CAN GET AN ANTIBIOTIC AND COUGH MEDICATION SO SHE CAN CONTINUE TO WORK.     [facility][id number] - [facility] [address] [facility] [address] [facility] AT [facility] [phone] [facility] [phone] FX

## 2023-10-11 DIAGNOSIS — J01.90 ACUTE SINUSITIS, RECURRENCE NOT SPECIFIED, UNSPECIFIED LOCATION: ICD-10-CM

## 2023-10-11 NOTE — TELEPHONE ENCOUNTER
Caller: Sera Xavier     Relationship: Self     Best call back number: [phone]     Requested Prescriptions:   Requested Prescriptions     Pending Prescriptions Disp Refills    fluconazole (DIFLUCAN) 150 MG tablet [Pharmacy Med Name: FLUCONAZOLE 150MG TABLETS] 2 tablet 0     Sig: TAKE 1 TABLET BY MOUTH FOR 1 DOSE THEN TAKE 1 TABLET IN 72 HOURS AFTER FIRST DOSE        Pharmacy where request should be sent: [facility] #[id number] [facility] [address] AT [facility] at [phone] [facility] [phone]      Last office visit with prescribing clinician: 10/9/2023   Last telemedicine visit with prescribing clinician: Visit date not found   Next office visit with prescribing clinician: Visit date not found     Additional details provided by patient: THE PATIENT IS CHECKING IN ON THIS REFILL REQUEST.  SHE IS REQUESTING THIS BE REFILLED TODAY BY DOMINIC BERGERON.    Luis Felipe Mi Rep   10/11/23 14:14 EDT

## 2023-10-12 ENCOUNTER — TELEPHONE (OUTPATIENT)
Dept: INTERNAL MEDICINE | Age: 43
End: 2023-10-12

## 2023-10-12 RX ORDER — FLUCONAZOLE 150 MG/1
TABLET ORAL
Qty: 2 TABLET | Refills: 0 | Status: SHIPPED | OUTPATIENT
Start: 2023-10-12

## 2023-10-12 NOTE — TELEPHONE ENCOUNTER
Caller: Sera Xavier    Relationship: Self    Best call back number: [phone]    What is the best time to reach you: ASAP    Who are you requesting to speak with (clinical staff, provider,  specific staff member): CLINICAL        What was the call regarding:   PATIENT STATED THAT [facility] IS SAYING THAT fluconazole (DIFLUCAN) 150 MG tablet HAS NOT BEEN APPROVED.    DUE TO THE ANTIBIOTICS SHE WAS PRESCRIBED SHE NEEDS THE ABOVE MEDICATION BECAUSE SHE NOW HAS A YEAST INFECTION WHICH HAPPENS WHEN SHE IS ON ANTIBIOTICS.    IT IS SHOWING APPROVED ON PATIENTS MY CHART BUT [facility] IS SAYING SOMETHING DIFFERENT.    PATIENT IS REALLY STRUGGLING AND HAVING A HARD TIME. PATIENT WOULD LIKE IF THIS CAN BE TAKEN CARE OF TODAY PLEASE.    [facility][id number] - [facility] [address] AT [facility] & [facility] - [phone] [facility] [phone]  [phone]     THANK YOU

## 2023-10-13 NOTE — TELEPHONE ENCOUNTER
I called pt to get more info regarding the Diflucan 'approval', I looked in CoverMyMeds and did not see a PA request, LDVM for pt to call office.

## 2023-10-18 ENCOUNTER — OFFICE VISIT (OUTPATIENT)
Dept: INTERNAL MEDICINE | Age: 43
End: 2023-10-18
Payer: COMMERCIAL

## 2023-10-18 VITALS
TEMPERATURE: 98.6 F | DIASTOLIC BLOOD PRESSURE: 88 MMHG | HEIGHT: 67 IN | OXYGEN SATURATION: 98 % | SYSTOLIC BLOOD PRESSURE: 136 MMHG | HEART RATE: 61 BPM | WEIGHT: 159.6 LBS | BODY MASS INDEX: 25.05 KG/M2

## 2023-10-18 DIAGNOSIS — R09.81 SINUS CONGESTION: ICD-10-CM

## 2023-10-18 DIAGNOSIS — J32.9 RECURRENT SINUSITIS: Primary | ICD-10-CM

## 2023-10-18 PROCEDURE — 99213 OFFICE O/P EST LOW 20 MIN: CPT | Performed by: NURSE PRACTITIONER

## 2023-10-18 RX ORDER — MOXIFLOXACIN HYDROCHLORIDE 400 MG/1
400 TABLET ORAL DAILY
Qty: 10 TABLET | Refills: 0 | Status: SHIPPED | OUTPATIENT
Start: 2023-10-18 | End: 2023-10-28

## 2023-10-18 RX ORDER — MONTELUKAST SODIUM 10 MG/1
10 TABLET ORAL NIGHTLY
Qty: 90 TABLET | Refills: 1 | Status: SHIPPED | OUTPATIENT
Start: 2023-10-18

## 2023-10-18 RX ORDER — FLUCONAZOLE 150 MG/1
TABLET ORAL
Qty: 2 TABLET | Refills: 0 | Status: SHIPPED | OUTPATIENT
Start: 2023-10-18

## 2023-10-18 NOTE — PROGRESS NOTES
I N T E R N A L  M E D I C I N E  CARLOS MUÑOZ, APRN      ENCOUNTER DATE:  10/18/2023    Sera Xavier / 43 y.o. / female      CHIEF COMPLAINT / REASON FOR OFFICE VISIT     Nasal Congestion, Headache, Sinusitis, URI, and Cough      ASSESSMENT & PLAN     Problem List Items Addressed This Visit          ENT    Recurrent sinusitis - Primary    Relevant Medications    moxifloxacin (AVELOX) 400 MG tablet    fluconazole (DIFLUCAN) 150 MG tablet    Other Relevant Orders    Ambulatory Referral to ENT (Otolaryngology) (Completed)    CT Sinus With & Without Contrast    Sinus congestion    Relevant Medications    montelukast (Singulair) 10 MG tablet    Other Relevant Orders    CT Sinus With & Without Contrast     Orders Placed This Encounter   Procedures    CT Sinus With & Without Contrast    Ambulatory Referral to ENT (Otolaryngology)     New Medications Ordered This Visit   Medications    moxifloxacin (AVELOX) 400 MG tablet     Sig: Take 1 tablet by mouth Daily for 10 days.     Dispense:  10 tablet     Refill:  0    fluconazole (DIFLUCAN) 150 MG tablet     Sig: TAKE 1 TABLET BY MOUTH FOR 1 DOSE THEN TAKE 1 TABLET IN 72 HOURS AFTER FIRST DOSE     Dispense:  2 tablet     Refill:  0    montelukast (Singulair) 10 MG tablet     Sig: Take 1 tablet by mouth Every Night.     Dispense:  90 tablet     Refill:  1       SUMMARY/DISCUSSION  Recurrent sinusitis, discussed potential of antibiotic resistance and that we really need to get to the root cause of her symptoms as we cannot consistently prescribe Avelox on a routine basis.  We will go ahead and treat now until we are able to further evaluate with CT sinus and referral to ENT.  We will add addition of Singulair, she will continue her antihistamine and Nasacort.  Unable to tolerate azelastine.  May continue albuterol as needed for chest tightness with consistent cough.  No wheezing    Next Appointment with me: Visit date not found    Return in about 6 months (around 4/18/2024)  "for Annual physical.      VITAL SIGNS     Visit Vitals  /88 (BP Location: Right arm, Patient Position: Sitting, Cuff Size: Adult)   Pulse 61   Temp 98.6 °F (37 °C) (Temporal)   Ht 170.8 cm (67.24\")   Wt 72.4 kg (159 lb 9.6 oz)   SpO2 98%   BMI 24.82 kg/m²     Wt Readings from Last 3 Encounters:   10/18/23 72.4 kg (159 lb 9.6 oz)   10/09/23 71.2 kg (157 lb)   09/01/23 70.3 kg (155 lb)     Body mass index is 24.82 kg/m².    MEDICATIONS AT THE TIME OF OFFICE VISIT     Current Outpatient Medications on File Prior to Visit   Medication Sig    acyclovir (ZOVIRAX) 400 MG tablet Take no more than 5 doses a day.    albuterol sulfate  (90 Base) MCG/ACT inhaler Inhale 2 puffs Every 4 (Four) Hours As Needed for Wheezing or Shortness of Air.    Ascorbic Acid (VITAMIN C ER PO) Take  by mouth.    B Complex Vitamins (VITAMIN B COMPLEX PO) Take  by mouth.    BIOTIN PO Take  by mouth.    cetirizine (zyrTEC) 10 MG tablet Take 1 tablet by mouth Daily.    COLLAGEN PO Take  by mouth.    GLUCOSAMINE-CHONDROITIN PO Take  by mouth.    ketotifen (ZADITOR) 0.025 % ophthalmic solution Administer 2 drops to both eyes Daily.    levocetirizine (XYZAL) 5 MG tablet Take 1 tablet by mouth Every Evening.    Methylsulfonylmethane (MSM PO) Take  by mouth.    mupirocin (Bactroban) 2 % cream Apply 1 application topically to the appropriate area as directed 2 (Two) Times a Day.    Omega-3 Fatty Acids (FISH OIL) 1000 MG capsule capsule Take  by mouth Daily With Breakfast.    omeprazole OTC (PRILOSEC OTC) 20 MG EC tablet Take 1 tablet by mouth Daily.    pseudoephedrine-guaifenesin (MUCINEX D)  MG per 12 hr tablet Take 1 tablet by mouth Every 12 (Twelve) Hours.    Triamcinolone Acetonide (NASACORT) 55 MCG/ACT nasal inhaler 2 sprays into the nostril(s) as directed by provider Daily.    [DISCONTINUED] fluconazole (DIFLUCAN) 150 MG tablet TAKE 1 TABLET BY MOUTH FOR 1 DOSE THEN TAKE 1 TABLET IN 72 HOURS AFTER FIRST DOSE    benzonatate " (TESSALON) 200 MG capsule Take 1 capsule by mouth 3 (Three) Times a Day As Needed for Cough. (Patient not taking: Reported on 10/18/2023)    [] doxycycline (VIBRAMYCIN) 100 MG capsule Take 1 capsule by mouth 2 (Two) Times a Day for 7 days.    [DISCONTINUED] azelastine (ASTELIN) 0.1 % nasal spray 2 sprays into the nostril(s) as directed by provider 2 (Two) Times a Day. Use in each nostril as directed (Patient not taking: Reported on 10/18/2023)    [DISCONTINUED] methylPREDNISolone (MEDROL) 4 MG dose pack Take as directed on package instructions. (Patient not taking: Reported on 10/18/2023)     No current facility-administered medications on file prior to visit.      HISTORY OF PRESENT ILLNESS     Patient presents with continued symptoms of nasal congestion, headache, sinus pressure and pain, cough.  She had a chest x-ray on 2023 with heart size normal, lungs clear.  At last office visit she was given Diflucan, doxycycline, Tessalon Perles, azelastine nasal spray, albuterol for sinobronchitis.  Prior to that she had also been treated for sinusitis with Medrol pack, Avelox .  Prior to that she was treated with same medication course in May 2023.  Unfortunately she requires Avelox for treatment as she states all other antibiotics are ineffective.     REVIEW OF SYSTEMS     Constitutional neg except per HPI   ENT sinus congestion, sinus pressure and pain   Resp cough   CV neg     PHYSICAL EXAMINATION     Physical Exam  Constitutional  No distress  ENT right maxillary sinus pain with palpation   Cardiovascular Rate  normal . Rhythm: regular . Heart sounds:  normal  Pulmonary/Chest  Effort normal. Breath sounds:  normal  Psychiatric  Alert. Judgment and thought content normal. Mood normal      REVIEWED DATA     Labs:   Lab Results   Component Value Date    GLUCOSE 85 2022    BUN 14 2022    CREATININE 0.88 2022    EGFRRESULT 84 2022    BCR 16 2022    K 4.4  03/31/2022    CO2 23 03/31/2022    CALCIUM 9.5 03/31/2022    PROTENTOTREF 6.8 03/31/2022    ALBUMIN 4.6 03/31/2022    BILITOT 1.0 03/31/2022    AST 27 03/31/2022    ALT 20 03/31/2022       Imaging:           Medical Tests:           Summary of old records / correspondence / consultant report:           Request outside records:           *Dragon dictation used for documentation.

## 2023-11-01 ENCOUNTER — TELEPHONE (OUTPATIENT)
Dept: INTERNAL MEDICINE | Age: 43
End: 2023-11-01

## 2023-11-01 NOTE — TELEPHONE ENCOUNTER
Caller: Sera Xavier    Relationship: Self    Best call back number: [phone]    What orders are you requesting (i.e. lab or imaging): CT SCAN    In what timeframe would the patient need to come in: ASAP    Where will you receive your lab/imaging services:     Additional notes: PATIENT STATED SHE CALLED SCHEDULING TO GET CT SCAN SCHEDULED, AND WAS ADVISED THE ORDER HAD BEEN CLOSED AND WILL NEED ANOTHER.

## 2023-11-22 ENCOUNTER — TELEPHONE (OUTPATIENT)
Dept: INTERNAL MEDICINE | Age: 43
End: 2023-11-22

## 2023-11-22 ENCOUNTER — HOSPITAL ENCOUNTER (OUTPATIENT)
Facility: HOSPITAL | Age: 43
Discharge: HOME | End: 2023-11-22
Admitting: NURSE PRACTITIONER
Payer: COMMERCIAL

## 2023-11-22 DIAGNOSIS — J32.9 RECURRENT SINUSITIS: ICD-10-CM

## 2023-11-22 DIAGNOSIS — R09.81 SINUS CONGESTION: ICD-10-CM

## 2023-11-22 PROCEDURE — 70486 CT MAXILLOFACIAL W/O DYE: CPT

## 2023-11-22 NOTE — TELEPHONE ENCOUNTER
Eladio with CT control room called saying that the order for sinuitis they dont need with contrast only without unless order was looking for something else. Eladio wanted to verify if order was for with and without, if order is only for sinuitis he said their procedure is to do the CT only without contrast. Call back number to verify [phone].

## 2023-11-28 DIAGNOSIS — J32.9 RECURRENT SINUSITIS: ICD-10-CM

## 2023-11-28 RX ORDER — FLUCONAZOLE 150 MG/1
TABLET ORAL
Qty: 2 TABLET | Refills: 0 | Status: SHIPPED | OUTPATIENT
Start: 2023-11-28

## 2023-11-30 ENCOUNTER — OFFICE VISIT (OUTPATIENT)
Dept: INTERNAL MEDICINE | Age: 43
End: 2023-11-30
Payer: COMMERCIAL

## 2023-11-30 VITALS
HEIGHT: 67 IN | OXYGEN SATURATION: 99 % | BODY MASS INDEX: 25.05 KG/M2 | WEIGHT: 159.6 LBS | TEMPERATURE: 98.6 F | SYSTOLIC BLOOD PRESSURE: 118 MMHG | HEART RATE: 93 BPM | DIASTOLIC BLOOD PRESSURE: 72 MMHG

## 2023-11-30 DIAGNOSIS — N89.8 VAGINAL DISCHARGE: Primary | ICD-10-CM

## 2023-11-30 DIAGNOSIS — Z11.3 ROUTINE SCREENING FOR STI (SEXUALLY TRANSMITTED INFECTION): ICD-10-CM

## 2023-11-30 LAB
BILIRUB BLD-MCNC: NEGATIVE MG/DL
CLARITY, POC: CLEAR
COLOR UR: YELLOW
EXPIRATION DATE: ABNORMAL
GLUCOSE UR STRIP-MCNC: NEGATIVE MG/DL
KETONES UR QL: NEGATIVE
LEUKOCYTE EST, POC: ABNORMAL
Lab: ABNORMAL
NITRITE UR-MCNC: NEGATIVE MG/ML
PH UR: 6 [PH] (ref 5–8)
PROT UR STRIP-MCNC: NEGATIVE MG/DL
RBC # UR STRIP: NEGATIVE /UL
SP GR UR: 1.01 (ref 1–1.03)
UROBILINOGEN UR QL: ABNORMAL

## 2023-11-30 PROCEDURE — 99213 OFFICE O/P EST LOW 20 MIN: CPT | Performed by: NURSE PRACTITIONER

## 2023-11-30 NOTE — PROGRESS NOTES
"    I N T E R N A L  M E D I C I N E  CARLOS MUÑOZ, APRN      ENCOUNTER DATE:  11/30/2023    Sera Xavier / 43 y.o. / female      CHIEF COMPLAINT / REASON FOR OFFICE VISIT     Vaginal Discharge (With itching, discharge has a greenish tint, noticed on Monday )      ASSESSMENT & PLAN     Problem List Items Addressed This Visit    None  Visit Diagnoses       Vaginal discharge    -  Primary    Relevant Orders    NuSwab BV & Candida - Swab, Vagina    Chlamydia trachomatis, Neisseria gonorrhoeae, Trichomonas vaginalis, PCR - Urine, Urine, Clean Catch    Routine screening for STI (sexually transmitted infection)        Relevant Orders    Chlamydia trachomatis, Neisseria gonorrhoeae, Trichomonas vaginalis, PCR - Urine, Urine, Clean Catch          Orders Placed This Encounter   Procedures    NuSwab BV & Candida - Swab, Vagina    Chlamydia trachomatis, Neisseria gonorrhoeae, Trichomonas vaginalis, PCR - Urine, Urine, Clean Catch     No orders of the defined types were placed in this encounter.      SUMMARY/DISCUSSION  Suspect trichomonas versus bacterial vaginosis we will go ahead and complete full STI screening along with BV candidiasis swab.  We will treat accordingly to results  Seek emergency care if development of severe pelvic pain fever chills    Next Appointment with me: 4/24/2024    No follow-ups on file.      VITAL SIGNS     Visit Vitals  /72 (BP Location: Left arm, Patient Position: Sitting, Cuff Size: Adult)   Pulse 93   Temp 98.6 °F (37 °C) (Temporal)   Ht 170.8 cm (67.24\")   Wt 72.4 kg (159 lb 9.6 oz)   SpO2 99%   BMI 24.82 kg/m²     Wt Readings from Last 3 Encounters:   11/30/23 72.4 kg (159 lb 9.6 oz)   10/18/23 72.4 kg (159 lb 9.6 oz)   10/09/23 71.2 kg (157 lb)     Body mass index is 24.82 kg/m².    MEDICATIONS AT THE TIME OF OFFICE VISIT     Current Outpatient Medications on File Prior to Visit   Medication Sig    acyclovir (ZOVIRAX) 400 MG tablet Take no more than 5 doses a day.    Ascorbic Acid " (VITAMIN C ER PO) Take  by mouth.    B Complex Vitamins (VITAMIN B COMPLEX PO) Take  by mouth.    cetirizine (zyrTEC) 10 MG tablet Take 1 tablet by mouth Daily.    COLLAGEN PO Take  by mouth.    fluconazole (DIFLUCAN) 150 MG tablet TAKE 1 TABLET BY MOUTH FOR ONE DOSE THEN, 1 TABLET IN 72 HOURS AFTER FIRST DOSE    GLUCOSAMINE-CHONDROITIN PO Take  by mouth.    ketotifen (ZADITOR) 0.025 % ophthalmic solution Administer 2 drops to both eyes Daily.    levocetirizine (XYZAL) 5 MG tablet Take 1 tablet by mouth Every Evening.    Methylsulfonylmethane (MSM PO) Take  by mouth.    montelukast (Singulair) 10 MG tablet Take 1 tablet by mouth Every Night.    mupirocin (Bactroban) 2 % cream Apply 1 application topically to the appropriate area as directed 2 (Two) Times a Day.    Omega-3 Fatty Acids (FISH OIL) 1000 MG capsule capsule Take  by mouth Daily With Breakfast.    omeprazole OTC (PRILOSEC OTC) 20 MG EC tablet Take 1 tablet by mouth Daily.    albuterol sulfate  (90 Base) MCG/ACT inhaler Inhale 2 puffs Every 4 (Four) Hours As Needed for Wheezing or Shortness of Air. (Patient not taking: Reported on 11/30/2023)    benzonatate (TESSALON) 200 MG capsule Take 1 capsule by mouth 3 (Three) Times a Day As Needed for Cough. (Patient not taking: Reported on 11/30/2023)    BIOTIN PO Take  by mouth. (Patient not taking: Reported on 11/30/2023)    pseudoephedrine-guaifenesin (MUCINEX D)  MG per 12 hr tablet Take 1 tablet by mouth Every 12 (Twelve) Hours. (Patient not taking: Reported on 11/30/2023)    Triamcinolone Acetonide (NASACORT) 55 MCG/ACT nasal inhaler 2 sprays into the nostril(s) as directed by provider Daily. (Patient not taking: Reported on 11/30/2023)     No current facility-administered medications on file prior to visit.      HISTORY OF PRESENT ILLNESS     Unprotected sexual encounter on Saturday, vaginal discharge with green tent started on Monday.  Does not describe as a foul odor with different than normal  odor.  She had no pelvic pain, no dysuria.  Treated for vaginal yeast with no improvement in symptoms with Diflucan.    REVIEW OF SYSTEMS     Constitutional neg except per HPI   Resp neg  CV neg   Gu vaginal discharge     PHYSICAL EXAMINATION     Physical Exam  Genitourinary:     General: Normal vulva.      Vagina: Vaginal discharge present.      Cervix: Erythema present.       REVIEWED DATA     Labs:           Imaging:           Medical Tests:           Summary of old records / correspondence / consultant report:           Request outside records:             *Dragon dictation used for documentation

## 2023-12-04 RX ORDER — METRONIDAZOLE 500 MG/1
500 TABLET ORAL 2 TIMES DAILY
Qty: 14 TABLET | Refills: 0 | Status: SHIPPED | OUTPATIENT
Start: 2023-12-04 | End: 2023-12-11

## 2023-12-05 DIAGNOSIS — J32.9 RECURRENT SINUSITIS: ICD-10-CM

## 2023-12-05 RX ORDER — FLUCONAZOLE 150 MG/1
TABLET ORAL
Qty: 2 TABLET | Refills: 0 | Status: SHIPPED | OUTPATIENT
Start: 2023-12-05

## 2023-12-06 DIAGNOSIS — A74.9 CHLAMYDIA: Primary | ICD-10-CM

## 2023-12-06 LAB
A VAGINAE DNA VAG QL NAA+PROBE: ABNORMAL SCORE
BVAB2 DNA VAG QL NAA+PROBE: ABNORMAL SCORE
C ALBICANS DNA VAG QL NAA+PROBE: POSITIVE
C GLABRATA DNA VAG QL NAA+PROBE: NEGATIVE
C TRACH RRNA SPEC QL NAA+PROBE: POSITIVE
MEGA1 DNA VAG QL NAA+PROBE: ABNORMAL SCORE
N GONORRHOEA RRNA SPEC QL NAA+PROBE: NEGATIVE
T VAGINALIS RRNA SPEC QL NAA+PROBE: NEGATIVE

## 2023-12-06 RX ORDER — AZITHROMYCIN 500 MG/1
1000 TABLET, FILM COATED ORAL DAILY
Qty: 2 TABLET | Refills: 0 | Status: SHIPPED | OUTPATIENT
Start: 2023-12-06

## 2023-12-15 DIAGNOSIS — J32.9 RECURRENT SINUSITIS: ICD-10-CM

## 2023-12-18 RX ORDER — FLUCONAZOLE 150 MG/1
TABLET ORAL
Qty: 2 TABLET | Refills: 0 | Status: SHIPPED | OUTPATIENT
Start: 2023-12-18

## 2023-12-29 ENCOUNTER — LAB (OUTPATIENT)
Dept: INTERNAL MEDICINE | Age: 43
End: 2023-12-29
Payer: COMMERCIAL

## 2023-12-29 DIAGNOSIS — A74.9 CHLAMYDIA: ICD-10-CM

## 2024-01-03 DIAGNOSIS — J32.9 RECURRENT SINUSITIS: ICD-10-CM

## 2024-01-03 LAB
C TRACH RRNA SPEC QL NAA+PROBE: NEGATIVE
N GONORRHOEA RRNA SPEC QL NAA+PROBE: NEGATIVE
T VAGINALIS RRNA SPEC QL NAA+PROBE: NEGATIVE

## 2024-01-03 RX ORDER — FLUCONAZOLE 150 MG/1
TABLET ORAL
Qty: 2 TABLET | Refills: 0 | Status: SHIPPED | OUTPATIENT
Start: 2024-01-03

## 2024-01-04 RX ORDER — METRONIDAZOLE 500 MG/1
500 TABLET ORAL 2 TIMES DAILY
Qty: 14 TABLET | Refills: 0 | Status: SHIPPED | OUTPATIENT
Start: 2024-01-04 | End: 2024-01-11

## 2024-01-24 NOTE — TELEPHONE ENCOUNTER
"Progress Note  [facility]    Subjective:      Cha Haynes is a 32 y.o. female here for adderall refill. Last refilled 12/18 and is currently out of medication. Patient formerly a patient at [facility]. [facility] no longer prescribes adderall thus patient switched care to [facility] in August 2023. We have yet to receive medical records from [facility]. Today, patient states she is still in [employer] school and is doing well. She is accompanied by her son. She denies headache, difficulty eating, insomnia, tachycardia, palpitations, insomnia.       Active Problem List with Overview Notes    Diagnosis Date Noted    Attention deficit hyperactivity disorder (ADHD), combined type 06/27/2023    HSV-1 (herpes simplex virus 1) infection 06/23/2023    Ruptured globe of right eye 09/07/2018    Retinal detachment, right 09/07/2018       Review of Systems   All other systems reviewed and are negative.        Objective:   /84   Pulse 91   Temp 98.1 °F (36.7 °C) (Oral)   Ht 5' 1" (1.549 m)   Wt 56.2 kg (123 lb 14.4 oz)   SpO2 100%   BMI 23.41 kg/m²      Physical Exam  Vitals and nursing note reviewed.   Constitutional:       Appearance: Normal appearance.   HENT:      Head: Normocephalic and atraumatic.      Right Ear: External ear normal.      Left Ear: External ear normal.      Nose: Nose normal.   Cardiovascular:      Rate and Rhythm: Normal rate and regular rhythm.   Pulmonary:      Effort: Pulmonary effort is normal.      Breath sounds: Normal breath sounds.   Abdominal:      General: Abdomen is flat.      Palpations: Abdomen is soft.   Musculoskeletal:      Cervical back: Neck supple.   Skin:     General: Skin is warm and dry.      Capillary Refill: Capillary refill takes less than 2 seconds.   Neurological:      Mental Status: She is alert and oriented to person, place, and time.   Psychiatric:         Mood and Affect: Mood normal.         Behavior: Behavior normal.      " Left message for patient to call to r/s appt for 04.23.2020 for CPE with Michelle Lewis.        Assessment:   32 y.o. with        1. Attention deficit hyperactivity disorder (ADHD), combined type         Plan:   Cha was seen today for medication refill.    Diagnoses and all orders for this visit:    Attention deficit hyperactivity disorder (ADHD), combined type  -     Will request medical records from [facility], drug screen today.  reviewed. Expect DS to be negative as patient has not taken medication in almost one week. No issues at this time  -     dextroamphetamine-amphetamine (ADDERALL) 20 mg tablet; Take 1 tablet by mouth 2 (two) times a day.  -     dextroamphetamine-amphetamine (ADDERALL) 20 mg tablet; Take 1 tablet by mouth 2 (two) times a day.  -     dextroamphetamine-amphetamine (ADDERALL) 20 mg tablet; Take 1 tablet by mouth 2 (two) times a day.  -     Drug screen panel, in-house; Future    Will need documentation of neuropsych testing/ documentation of formal diagnosis of ADHD prior to further prescriptions    Odilon Mcmahon DO  [facility], PGY-2  11:14 AM, 01/24/2024

## 2024-02-05 ENCOUNTER — OFFICE VISIT (OUTPATIENT)
Dept: INTERNAL MEDICINE | Age: 44
End: 2024-02-05
Payer: COMMERCIAL

## 2024-02-05 VITALS
WEIGHT: 156 LBS | OXYGEN SATURATION: 97 % | SYSTOLIC BLOOD PRESSURE: 130 MMHG | HEART RATE: 106 BPM | DIASTOLIC BLOOD PRESSURE: 74 MMHG | TEMPERATURE: 97.6 F | HEIGHT: 67 IN | BODY MASS INDEX: 24.48 KG/M2

## 2024-02-05 DIAGNOSIS — N89.8 VAGINAL DISCHARGE: Primary | ICD-10-CM

## 2024-02-05 PROCEDURE — 99213 OFFICE O/P EST LOW 20 MIN: CPT

## 2024-02-05 NOTE — PROGRESS NOTES
"    I N T E R N A L  M E D I C I N E  Suzanne Herrera, APRN    ENCOUNTER DATE:  02/05/2024    Sera Xavier / 43 y.o. / female      CHIEF COMPLAINT / REASON FOR OFFICE VISIT     Vaginitis (/)      ASSESSMENT & PLAN     Diagnoses and all orders for this visit:    1. Vaginal discharge (Primary)  -     NuSwab BV & Candida - Swab, Vagina  -     Chlamydia trachomatis, Neisseria gonorrhoeae, Trichomonas vaginalis, PCR - Urine, Urine, Clean Catch         SUMMARY/DISCUSSION  Recommend she start daily vaginal health probiotic from OTC.  Will re assess for candida and ensure chlamydia has been successfully treated with repeat testing.  Visit ER for any acutely worsening symptoms.        Next Appointment with me: Visit date not found    Return for Next scheduled follow up.      VITAL SIGNS     Visit Vitals  /74   Pulse 106   Temp 97.6 °F (36.4 °C)   Ht 170.8 cm (67.24\")   Wt 70.8 kg (156 lb)   SpO2 97%   BMI 24.26 kg/m²             Wt Readings from Last 3 Encounters:   02/05/24 70.8 kg (156 lb)   11/30/23 72.4 kg (159 lb 9.6 oz)   10/18/23 72.4 kg (159 lb 9.6 oz)     Body mass index is 24.26 kg/m².        MEDICATIONS AT THE TIME OF OFFICE VISIT     Current Outpatient Medications on File Prior to Visit   Medication Sig Dispense Refill    acyclovir (ZOVIRAX) 400 MG tablet Take no more than 5 doses a day. 30 tablet 11    albuterol sulfate  (90 Base) MCG/ACT inhaler Inhale 2 puffs Every 4 (Four) Hours As Needed for Wheezing or Shortness of Air. 18 g 0    Ascorbic Acid (VITAMIN C ER PO) Take  by mouth.      azithromycin (Zithromax) 500 MG tablet Take 2 tablets by mouth Daily. 2 tablet 0    B Complex Vitamins (VITAMIN B COMPLEX PO) Take  by mouth.      BIOTIN PO Take  by mouth.      cetirizine (zyrTEC) 10 MG tablet Take 1 tablet by mouth Daily.      COLLAGEN PO Take  by mouth.      GLUCOSAMINE-CHONDROITIN PO Take  by mouth.      ketotifen (ZADITOR) 0.025 % ophthalmic solution Administer 2 drops to both eyes Daily.      " Fany Santos (:  1939) is a 78 levocetirizine (XYZAL) 5 MG tablet Take 1 tablet by mouth Every Evening.      Methylsulfonylmethane (MSM PO) Take  by mouth.      montelukast (Singulair) 10 MG tablet Take 1 tablet by mouth Every Night. 90 tablet 1    mupirocin (Bactroban) 2 % cream Apply 1 application topically to the appropriate area as directed 2 (Two) Times a Day. 15 g 0    Omega-3 Fatty Acids (FISH OIL) 1000 MG capsule capsule Take  by mouth Daily With Breakfast.      omeprazole OTC (PRILOSEC OTC) 20 MG EC tablet Take 1 tablet by mouth Daily.      pseudoephedrine-guaifenesin (MUCINEX D)  MG per 12 hr tablet Take 1 tablet by mouth Every 12 (Twelve) Hours.      Triamcinolone Acetonide (NASACORT) 55 MCG/ACT nasal inhaler 2 sprays into the nostril(s) as directed by provider Daily.      [DISCONTINUED] benzonatate (TESSALON) 200 MG capsule Take 1 capsule by mouth 3 (Three) Times a Day As Needed for Cough. 30 capsule 0    [DISCONTINUED] fluconazole (DIFLUCAN) 150 MG tablet TAKE ONE TABLET BY MOUTH FOR ONE DOSE, MAY REPEAT IN 72 HOURS 2 tablet 0     No current facility-administered medications on file prior to visit.        HISTORY OF PRESENT ILLNESS     Seen by PCP on 11/30/2023 for concerns of vaginal discharge.  At that time she tested positive for candida and chlamydia.  She was treated with diflucan and azithromycin 1 gm with initial improvement in her symptoms.  However, she reports vaginal discharge started again approximately early January 2024.  Discharge is thin, pale white.  She has been using OTC vaginal yeast infection treatments without improvement in her symptoms.  Denies fever, chills, urinary complaints, abdominal/ pelvic, back, flank pain.  Denies any new sexual encounters since 2023.  Not followed by GYN.        Patient Care Team:  Danny Hilton APRN as PCP - General (Internal Medicine)    REVIEW OF SYSTEMS     Review of Systems   Constitutional:  Negative for chills, fever and unexpected weight change.   Respiratory:  Negative  for cough, chest tightness and shortness of breath.    Cardiovascular:  Negative for chest pain, palpitations and leg swelling.   Genitourinary:  Positive for vaginal discharge. Negative for decreased urine volume, difficulty urinating, dysuria, flank pain, frequency, genital sores, hematuria, pelvic pain and urgency.   Neurological:  Negative for dizziness, weakness, light-headedness and headaches.   Psychiatric/Behavioral:  The patient is not nervous/anxious.           PHYSICAL EXAMINATION     Physical Exam  Vitals reviewed. Exam conducted with a chaperone present.   Constitutional:       General: She is not in acute distress.     Appearance: Normal appearance. She is not ill-appearing, toxic-appearing or diaphoretic.   HENT:      Head: Normocephalic and atraumatic.   Cardiovascular:      Rate and Rhythm: Normal rate and regular rhythm.      Heart sounds: Normal heart sounds.   Pulmonary:      Effort: Pulmonary effort is normal.      Breath sounds: Normal breath sounds.   Genitourinary:     General: Normal vulva.      Vagina: Vaginal discharge (Small amount of cloudy, thin discharge) present.   Neurological:      Mental Status: She is alert and oriented to person, place, and time. Mental status is at baseline.   Psychiatric:         Mood and Affect: Mood normal.         Behavior: Behavior normal.         Thought Content: Thought content normal.         Judgment: Judgment normal.           REVIEWED DATA     Labs:           Imaging:            Medical Tests:           Summary of old records / correspondence / consultant report:           Request outside records:

## 2024-02-07 DIAGNOSIS — B37.31 VAGINAL CANDIDA: Primary | ICD-10-CM

## 2024-02-07 LAB
A VAGINAE DNA VAG QL NAA+PROBE: ABNORMAL SCORE
BVAB2 DNA VAG QL NAA+PROBE: ABNORMAL SCORE
C ALBICANS DNA VAG QL NAA+PROBE: POSITIVE
C GLABRATA DNA VAG QL NAA+PROBE: NEGATIVE
C TRACH RRNA SPEC QL NAA+PROBE: NEGATIVE
MEGA1 DNA VAG QL NAA+PROBE: ABNORMAL SCORE
N GONORRHOEA RRNA SPEC QL NAA+PROBE: NEGATIVE
T VAGINALIS RRNA SPEC QL NAA+PROBE: NEGATIVE

## 2024-02-07 RX ORDER — FLUCONAZOLE 150 MG/1
TABLET ORAL
Qty: 3 TABLET | Refills: 0 | Status: SHIPPED | OUTPATIENT
Start: 2024-02-07

## 2024-02-08 DIAGNOSIS — J34.89 SINUS PRESSURE: ICD-10-CM

## 2024-02-09 DIAGNOSIS — B37.31 RECURRENT CANDIDIASIS OF VAGINA: Primary | ICD-10-CM

## 2024-02-11 RX ORDER — METHYLPREDNISOLONE 4 MG/1
TABLET ORAL
Qty: 21 EACH | OUTPATIENT
Start: 2024-02-11

## 2024-02-12 ENCOUNTER — PATIENT ROUNDING (BHMG ONLY) (OUTPATIENT)
Dept: OBSTETRICS AND GYNECOLOGY | Age: 44
End: 2024-02-12
Payer: COMMERCIAL

## 2024-02-12 ENCOUNTER — OFFICE VISIT (OUTPATIENT)
Dept: OBSTETRICS AND GYNECOLOGY | Age: 44
End: 2024-02-12
Payer: COMMERCIAL

## 2024-02-12 VITALS
HEIGHT: 67 IN | SYSTOLIC BLOOD PRESSURE: 104 MMHG | DIASTOLIC BLOOD PRESSURE: 62 MMHG | WEIGHT: 154 LBS | BODY MASS INDEX: 24.17 KG/M2

## 2024-02-12 DIAGNOSIS — Z11.3 SCREEN FOR STD (SEXUALLY TRANSMITTED DISEASE): ICD-10-CM

## 2024-02-12 DIAGNOSIS — Z20.2 EXPOSURE TO CHLAMYDIA: ICD-10-CM

## 2024-02-12 DIAGNOSIS — Z01.419 ENCOUNTER FOR GYNECOLOGICAL EXAMINATION WITHOUT ABNORMAL FINDING: Primary | ICD-10-CM

## 2024-02-12 DIAGNOSIS — Z12.31 SCREENING MAMMOGRAM FOR BREAST CANCER: ICD-10-CM

## 2024-02-12 DIAGNOSIS — N89.8 VAGINAL DISCHARGE: ICD-10-CM

## 2024-02-12 RX ORDER — DOXYCYCLINE HYCLATE 100 MG/1
100 CAPSULE ORAL 2 TIMES DAILY
Qty: 14 CAPSULE | Refills: 0 | Status: SHIPPED | OUTPATIENT
Start: 2024-02-12 | End: 2024-02-19

## 2024-02-15 LAB
A VAGINAE DNA VAG QL NAA+PROBE: ABNORMAL SCORE
BVAB2 DNA VAG QL NAA+PROBE: ABNORMAL SCORE
C ALBICANS DNA VAG QL NAA+PROBE: POSITIVE
C GLABRATA DNA VAG QL NAA+PROBE: NEGATIVE
C TRACH DNA VAG QL NAA+PROBE: NEGATIVE
MEGA1 DNA VAG QL NAA+PROBE: ABNORMAL SCORE
N GONORRHOEA DNA VAG QL NAA+PROBE: NEGATIVE
T VAGINALIS DNA VAG QL NAA+PROBE: NEGATIVE

## 2024-03-06 ENCOUNTER — TELEPHONE (OUTPATIENT)
Dept: OBSTETRICS AND GYNECOLOGY | Age: 44
End: 2024-03-06
Payer: COMMERCIAL

## 2024-03-06 DIAGNOSIS — B37.9 YEAST INFECTION: Primary | ICD-10-CM

## 2024-03-06 RX ORDER — FLUCONAZOLE 150 MG/1
150 TABLET ORAL DAILY
Qty: 3 TABLET | Refills: 1 | Status: SHIPPED | OUTPATIENT
Start: 2024-03-06 | End: 2024-03-07

## 2024-03-06 NOTE — TELEPHONE ENCOUNTER
DIDIER MANNING     [phone]    PT STILL HAS HER RECURRENT YEAST INFECTION THE TERAZOL CAUSE THE ITCHING TO STOP FOR MAYBE 2 DAYS & STARTED BACK UP

## 2024-03-06 NOTE — TELEPHONE ENCOUNTER
Pt murali she has already tried both treatments and wants messge sent to Dr Agrawal.Pt aware she is out till tomorrow.

## 2024-03-07 ENCOUNTER — TELEPHONE (OUTPATIENT)
Dept: OBSTETRICS AND GYNECOLOGY | Age: 44
End: 2024-03-07
Payer: COMMERCIAL

## 2024-03-07 DIAGNOSIS — B37.31 YEAST VAGINITIS: Primary | ICD-10-CM

## 2024-03-07 RX ORDER — FLUCONAZOLE 150 MG/1
TABLET ORAL
Qty: 6 TABLET | Refills: 1 | Status: SHIPPED | OUTPATIENT
Start: 2024-03-07

## 2024-03-07 RX ORDER — CLOTRIMAZOLE AND BETAMETHASONE DIPROPIONATE 10; .64 MG/G; MG/G
CREAM TOPICAL
Qty: 30 G | Refills: 1 | Status: SHIPPED | OUTPATIENT
Start: 2024-03-07

## 2024-03-07 NOTE — TELEPHONE ENCOUNTER
Spoke with pt  Symptoms of intense itching and white discharge returned shortly after terazol completed  Pt has tried diflucan multiple times in the past  Denies any blisters  Spot of intense itching externally near clitoris  Not tested for diabetes  Noted that a strict diet helped with her yeast recurrence in the past  Recommend weekly diflucan x 6 with lotrisone externally .  Consider boric acid supp. Recommend testing for diabetes.  Pt agreeable

## 2024-05-31 ENCOUNTER — OFFICE VISIT (OUTPATIENT)
Dept: INTERNAL MEDICINE | Age: 44
End: 2024-05-31
Payer: COMMERCIAL

## 2024-05-31 VITALS
HEART RATE: 94 BPM | OXYGEN SATURATION: 100 % | RESPIRATION RATE: 16 BRPM | BODY MASS INDEX: 23.86 KG/M2 | HEIGHT: 67 IN | WEIGHT: 152 LBS | DIASTOLIC BLOOD PRESSURE: 74 MMHG | SYSTOLIC BLOOD PRESSURE: 120 MMHG

## 2024-05-31 DIAGNOSIS — B37.31 VAGINAL YEAST INFECTION: ICD-10-CM

## 2024-05-31 DIAGNOSIS — B96.89 ACUTE BACTERIAL RHINOSINUSITIS: ICD-10-CM

## 2024-05-31 DIAGNOSIS — J01.90 ACUTE BACTERIAL RHINOSINUSITIS: ICD-10-CM

## 2024-05-31 DIAGNOSIS — J32.9 RECURRENT SINUSITIS: Primary | ICD-10-CM

## 2024-05-31 PROCEDURE — 99213 OFFICE O/P EST LOW 20 MIN: CPT

## 2024-05-31 RX ORDER — FLUCONAZOLE 150 MG/1
150 TABLET ORAL ONCE
Qty: 1 TABLET | Refills: 0 | Status: SHIPPED | OUTPATIENT
Start: 2024-05-31 | End: 2024-05-31

## 2024-05-31 RX ORDER — DOXYCYCLINE HYCLATE 100 MG/1
100 CAPSULE ORAL 2 TIMES DAILY
Qty: 20 CAPSULE | Refills: 0 | Status: SHIPPED | OUTPATIENT
Start: 2024-05-31 | End: 2024-06-10

## 2024-05-31 NOTE — PROGRESS NOTES
"    I N T E R N A L  M E D I C I N E  Suzanne Herrera, APRN    ENCOUNTER DATE:  05/31/2024    Sera Xavier / 44 y.o. / female      CHIEF COMPLAINT / REASON FOR OFFICE VISIT     Cough, Nasal Congestion, and Sinusitis (Started around derby but she still having congestion on right side and she have took muniex )      ASSESSMENT & PLAN     Diagnoses and all orders for this visit:    1. Recurrent sinusitis (Primary)  -     Ambulatory Referral to ENT (Otolaryngology)    2. Acute bacterial rhinosinusitis  -     doxycycline (VIBRAMYCIN) 100 MG capsule; Take 1 capsule by mouth 2 (Two) Times a Day for 10 days.  Dispense: 20 capsule; Refill: 0    3. Vaginal yeast infection  -     fluconazole (Diflucan) 150 MG tablet; Take 1 tablet by mouth 1 (One) Time for 1 dose.  Dispense: 1 tablet; Refill: 0         SUMMARY/DISCUSSION  Given recurrent nature of sinus infections and PCN allergy, treat with 10 day course of doxycycline.  Return for non improving symptoms.  Referral placed for ENT evaluation.  Diflucan prescribed per patient request.  Visit ER for any acutely worsening symptoms.         Next Appointment with me: Visit date not found    Return for Needs annual physical with PCP.      VITAL SIGNS     Visit Vitals  /74   Pulse 94   Resp 16   Ht 170.8 cm (67.24\")   Wt 68.9 kg (152 lb)   SpO2 100%   BMI 23.63 kg/m²             Wt Readings from Last 3 Encounters:   05/31/24 68.9 kg (152 lb)   02/12/24 69.9 kg (154 lb)   02/05/24 70.8 kg (156 lb)     Body mass index is 23.63 kg/m².        MEDICATIONS AT THE TIME OF OFFICE VISIT     Current Outpatient Medications on File Prior to Visit   Medication Sig Dispense Refill    acyclovir (ZOVIRAX) 400 MG tablet Take no more than 5 doses a day. 30 tablet 11    albuterol sulfate  (90 Base) MCG/ACT inhaler Inhale 2 puffs Every 4 (Four) Hours As Needed for Wheezing or Shortness of Air. 18 g 0    Ascorbic Acid (VITAMIN C ER PO) Take  by mouth.      B Complex Vitamins (VITAMIN B " COMPLEX PO) Take  by mouth.      BIOTIN PO Take  by mouth.      cetirizine (zyrTEC) 10 MG tablet Take 1 tablet by mouth Daily.      clotrimazole-betamethasone (Lotrisone) 1-0.05 % cream Apply external genitalia twice daily for 2 weeks then as needed daily 30 g 1    COLLAGEN PO Take  by mouth.      GLUCOSAMINE-CHONDROITIN PO Take  by mouth.      ketotifen (ZADITOR) 0.025 % ophthalmic solution Administer 2 drops to both eyes Daily.      levocetirizine (XYZAL) 5 MG tablet Take 1 tablet by mouth Every Evening.      Methylsulfonylmethane (MSM PO) Take  by mouth.      omeprazole OTC (PRILOSEC OTC) 20 MG EC tablet Take 1 tablet by mouth Daily.      pseudoephedrine-guaifenesin (MUCINEX D)  MG per 12 hr tablet Take 1 tablet by mouth Every 12 (Twelve) Hours.      Triamcinolone Acetonide (NASACORT) 55 MCG/ACT nasal inhaler 2 sprays into the nostril(s) as directed by provider Daily.      [DISCONTINUED] fluconazole (Diflucan) 150 MG tablet Take one tab weekly for 6 weeks 6 tablet 1    azithromycin (Zithromax) 500 MG tablet Take 2 tablets by mouth Daily. (Patient not taking: Reported on 2/12/2024) 2 tablet 0    montelukast (Singulair) 10 MG tablet Take 1 tablet by mouth Every Night. (Patient not taking: Reported on 5/31/2024) 90 tablet 1    mupirocin (Bactroban) 2 % cream Apply 1 application topically to the appropriate area as directed 2 (Two) Times a Day. (Patient not taking: Reported on 5/31/2024) 15 g 0    Omega-3 Fatty Acids (FISH OIL) 1000 MG capsule capsule Take  by mouth Daily With Breakfast. (Patient not taking: Reported on 5/31/2024)       No current facility-administered medications on file prior to visit.        HISTORY OF PRESENT ILLNESS     Symptoms started in early May with cough, sinus congestion.  Cough has now resolved but continues with right maxillary sinus pressure and pain.  Medical history significant for recurrent sinusitis.  Previously evaluated by ENT but did not feel as though appointment was  beneficial and is agreeable for another referral.  Continues on Mucinex DM, Zyrtec, Nasacort nasal spray.  Allergic to PCN/ sulfa.    November 2023 CT Sinus showed moderate left maxillary sinusitis, very minimal mucosal thickening in the inferior right maxillary sinus.      Patient Care Team:  Danny Hilton, VERONICA, APRN as PCP - General (Internal Medicine)    REVIEW OF SYSTEMS     Review of Systems   Constitutional:  Negative for chills, fever and unexpected weight change.   HENT:  Positive for congestion, sinus pressure and sinus pain.    Respiratory:  Negative for cough, chest tightness and shortness of breath.    Cardiovascular:  Negative for chest pain, palpitations and leg swelling.   Neurological:  Negative for dizziness, weakness, light-headedness and headaches.   Psychiatric/Behavioral:  The patient is not nervous/anxious.           PHYSICAL EXAMINATION     Physical Exam  Vitals reviewed.   Constitutional:       General: She is not in acute distress.     Appearance: Normal appearance. She is not ill-appearing, toxic-appearing or diaphoretic.   HENT:      Head: Normocephalic and atraumatic.      Right Ear: Tympanic membrane, ear canal and external ear normal. There is no impacted cerumen.      Left Ear: Tympanic membrane, ear canal and external ear normal. There is no impacted cerumen.      Nose: Nose normal. Congestion present. No rhinorrhea.      Right Sinus: Maxillary sinus tenderness present. No frontal sinus tenderness.      Left Sinus: No maxillary sinus tenderness or frontal sinus tenderness.      Mouth/Throat:      Mouth: Mucous membranes are moist.      Pharynx: Oropharynx is clear. No oropharyngeal exudate or posterior oropharyngeal erythema.   Cardiovascular:      Rate and Rhythm: Normal rate and regular rhythm.      Heart sounds: Normal heart sounds.   Pulmonary:      Effort: Pulmonary effort is normal.      Breath sounds: Normal breath sounds.   Lymphadenopathy:      Cervical: No cervical  adenopathy.   Neurological:      Mental Status: She is alert and oriented to person, place, and time. Mental status is at baseline.   Psychiatric:         Mood and Affect: Mood normal.         Behavior: Behavior normal.         Thought Content: Thought content normal.         Judgment: Judgment normal.           REVIEWED DATA     Labs:           Imaging:            Medical Tests:           Summary of old records / correspondence / consultant report:           Request outside records:

## 2024-07-26 DIAGNOSIS — B00.1 RECURRENT COLD SORES: ICD-10-CM

## 2024-07-26 RX ORDER — ACYCLOVIR 400 MG/1
TABLET ORAL
Qty: 30 TABLET | Refills: 11 | Status: SHIPPED | OUTPATIENT
Start: 2024-07-26

## 2024-07-26 NOTE — TELEPHONE ENCOUNTER
"    Caller: XavierSera carpio \"Cherise\"    Relationship: Self    Best call back number: [phone]     Requested Prescriptions:   Requested Prescriptions     Pending Prescriptions Disp Refills    acyclovir (ZOVIRAX) 400 MG tablet 30 tablet 11     Sig: Take no more than 5 doses a day.        Pharmacy where request should be sent:      Last office visit with prescribing clinician: 11/30/2023   Last telemedicine visit with prescribing clinician: Visit date not found   Next office visit with prescribing clinician: Visit date not found       Does the patient have less than a 3 day supply:  [x] Yes  [] No    Would you like a call back once the refill request has been completed: [] Yes [] No    If the office needs to give you a call back, can they leave a voicemail: [] Yes [] No    Luis Felipe Bates Rep   07/26/24 10:35 EDT     "

## 2025-02-25 ENCOUNTER — TELEPHONE (OUTPATIENT)
Dept: OBSTETRICS AND GYNECOLOGY | Age: 45
End: 2025-02-25
Payer: COMMERCIAL

## 2025-02-26 DIAGNOSIS — J32.9 RECURRENT SINUSITIS: ICD-10-CM

## 2025-02-27 RX ORDER — FLUCONAZOLE 150 MG/1
TABLET ORAL
Qty: 2 TABLET | Refills: 0 | Status: SHIPPED | OUTPATIENT
Start: 2025-02-27

## 2025-04-08 ENCOUNTER — OFFICE VISIT (OUTPATIENT)
Dept: INTERNAL MEDICINE | Age: 45
End: 2025-04-08
Payer: COMMERCIAL

## 2025-04-08 VITALS
TEMPERATURE: 97.9 F | OXYGEN SATURATION: 99 % | SYSTOLIC BLOOD PRESSURE: 116 MMHG | DIASTOLIC BLOOD PRESSURE: 62 MMHG | BODY MASS INDEX: 26.06 KG/M2 | HEART RATE: 69 BPM | HEIGHT: 67 IN | WEIGHT: 166 LBS

## 2025-04-08 DIAGNOSIS — Z72.51 UNPROTECTED SEXUAL INTERCOURSE: Primary | ICD-10-CM

## 2025-04-08 DIAGNOSIS — N94.9 VAGINAL DISCOMFORT: ICD-10-CM

## 2025-04-08 DIAGNOSIS — N89.8 VAGINAL DISCHARGE: ICD-10-CM

## 2025-04-08 DIAGNOSIS — N89.8 VAGINAL DRYNESS: ICD-10-CM

## 2025-04-08 NOTE — PROGRESS NOTES
"    I N T E R N A L  M E D I C I N E  CARLOS MUÑOZ, APRN      ENCOUNTER DATE:  04/08/2025    Sera Xavier / 45 y.o. / female      CHIEF COMPLAINT / REASON FOR OFFICE VISIT     bladder pain, Urinary Frequency, and Abdominal Pain    ASSESSMENT & PLAN     Problem List Items Addressed This Visit    None  Visit Diagnoses         Unprotected sexual intercourse    -  Primary    Relevant Orders    Chlamydia trachomatis, Neisseria gonorrhoeae, Trichomonas vaginalis, PCR - Urine, Urine, Clean Catch    Genital Mycoplasma RODGER Urine - Urine, Clean Catch      Vaginal discharge        Relevant Orders    Bacterial Vaginosis, RODGER - Swab, Vagina      Vaginal discomfort        Relevant Orders    Bacterial Vaginosis, RODGER - Swab, Vagina      Vaginal dryness        Relevant Orders    Testosterone, Free, Total    Estrogens, Total    FSH & LH          Orders Placed This Encounter   Procedures    Chlamydia trachomatis, Neisseria gonorrhoeae, Trichomonas vaginalis, PCR - Urine, Urine, Clean Catch    Bacterial Vaginosis, RODGER - Swab, Vagina    Genital Mycoplasma RODGER Urine - Urine, Clean Catch    Testosterone, Free, Total    Estrogens, Total    FSH & LH     No orders of the defined types were placed in this encounter.      SUMMARY/DISCUSSION  Will rule out additional causes including STI screening.  Patient requesting hormonal workup with dry vaginal tissue      Next Appointment with me: Visit date not found    No follow-ups on file.      VITAL SIGNS     Visit Vitals  /62   Pulse 69   Temp 97.9 °F (36.6 °C)   Ht 170.8 cm (67.24\")   Wt 75.3 kg (166 lb)   SpO2 99%   BMI 25.81 kg/m²       Wt Readings from Last 3 Encounters:   04/08/25 75.3 kg (166 lb)   05/31/24 68.9 kg (152 lb)   02/12/24 69.9 kg (154 lb)     Body mass index is 25.81 kg/m².      MEDICATIONS AT THE TIME OF OFFICE VISIT     Current Outpatient Medications on File Prior to Visit   Medication Sig    acyclovir (ZOVIRAX) 400 MG tablet Take no more than 5 doses a day.    Ascorbic " Acid (VITAMIN C ER PO) Take  by mouth.    B Complex Vitamins (VITAMIN B COMPLEX PO) Take  by mouth.    BIOTIN PO Take  by mouth.    cetirizine (zyrTEC) 10 MG tablet Take 1 tablet by mouth Daily.    COLLAGEN PO Take  by mouth.    GLUCOSAMINE-CHONDROITIN PO Take  by mouth.    ketotifen (ZADITOR) 0.025 % ophthalmic solution Administer 2 drops to both eyes Daily.    levocetirizine (XYZAL) 5 MG tablet Take 1 tablet by mouth Every Evening.    Methylsulfonylmethane (MSM PO) Take  by mouth.    montelukast (Singulair) 10 MG tablet Take 1 tablet by mouth Every Night.    mupirocin (Bactroban) 2 % cream Apply 1 application topically to the appropriate area as directed 2 (Two) Times a Day.    Omega-3 Fatty Acids (FISH OIL) 1000 MG capsule capsule Take  by mouth Daily With Breakfast.    omeprazole OTC (PRILOSEC OTC) 20 MG EC tablet Take 1 tablet by mouth Daily.    pseudoephedrine-guaifenesin (MUCINEX D)  MG per 12 hr tablet Take 1 tablet by mouth Every 12 (Twelve) Hours.    Triamcinolone Acetonide (NASACORT) 55 MCG/ACT nasal inhaler Administer 2 sprays into the nostril(s) as directed by provider Daily.    albuterol sulfate  (90 Base) MCG/ACT inhaler Inhale 2 puffs Every 4 (Four) Hours As Needed for Wheezing or Shortness of Air. (Patient not taking: Reported on 4/8/2025)    azithromycin (Zithromax) 500 MG tablet Take 2 tablets by mouth Daily. (Patient not taking: Reported on 4/8/2025)    clotrimazole-betamethasone (Lotrisone) 1-0.05 % cream Apply external genitalia twice daily for 2 weeks then as needed daily (Patient not taking: Reported on 4/8/2025)    fluconazole (DIFLUCAN) 150 MG tablet TAKE 1 TABLET BY MOUTH FOR ONE DOSE. MAY REPEAT IN 72 HOURS (Patient not taking: Reported on 4/8/2025)     No current facility-administered medications on file prior to visit.      HISTORY OF PRESENT ILLNESS     Patient has had been having some pelvic discomfort, no vaginal bleeding, history of hysterectomy partial lap.  She did  have unprotected sexual intercourse.  Some vaginal discharge with vaginal dryness and irritation.  Urine dipstick completely negative with no blood protein infection or nitrates today.  No flank pain.    REVIEW OF SYSTEMS     Constitutional neg except per HPI   Resp neg  CV neg   GI vaginal discomfort    PHYSICAL EXAMINATION     Physical Exam  Abdominal:      Tenderness: There is no right CVA tenderness or left CVA tenderness.   Genitourinary:     General: Normal vulva.      Exam position: Lithotomy position.      Labia:         Right: No rash or tenderness.         Left: No rash or tenderness.       Vagina: Normal.       REVIEWED DATA     Labs:   Lab Results   Component Value Date    GLUCOSE 95 03/11/2024    BUN 13 03/11/2024    CREATININE 0.89 03/11/2024    EGFR 81.6 03/11/2024    BCR 14.6 03/11/2024    K 4.6 03/11/2024    CO2 24.5 03/11/2024    CALCIUM 9.4 03/11/2024    ALBUMIN 4.6 03/11/2024    BILITOT 1.0 03/11/2024    AST 18 03/11/2024    ALT 22 03/11/2024     Lab Results   Component Value Date    WBC 4.3 03/31/2022    HGB 12.7 03/31/2022    HCT 37.1 03/31/2022    MCV 95 03/31/2022     03/31/2022     Lab Results   Component Value Date    CHLPL 178 03/31/2022    TRIG 58 03/31/2022    HDL 90 03/31/2022    LDL 77 03/31/2022      Lab Results   Component Value Date    TSH 2.230 03/31/2022     Brief Urine Lab Results       None          Lab Results   Component Value Date    HGBA1C 4.80 03/11/2024       Imaging:           Medical Tests:           Summary of old records / correspondence / consultant report:           Request outside records:

## 2025-04-09 LAB
A VAGINAE DNA VAG QL NAA+PROBE: NORMAL SCORE
BVAB2 DNA VAG QL NAA+PROBE: NORMAL SCORE
MEGA1 DNA VAG QL NAA+PROBE: NORMAL SCORE

## 2025-04-10 LAB
C TRACH RRNA SPEC QL NAA+PROBE: NEGATIVE
M GENITALIUM DNA UR QL NAA+PROBE: NEGATIVE
M HOMINIS DNA SPEC QL NAA+PROBE: NEGATIVE
N GONORRHOEA RRNA SPEC QL NAA+PROBE: NEGATIVE
T VAGINALIS RRNA SPEC QL NAA+PROBE: NEGATIVE
UREAPLASMA DNA SPEC QL NAA+PROBE: NEGATIVE

## 2025-04-11 LAB
ESTROGEN SERPL-MCNC: 432 PG/ML
FSH SERPL-ACNC: 20.4 MIU/ML
LH SERPL-ACNC: 43.1 MIU/ML
TESTOST FREE SERPL-MCNC: 0.4 PG/ML (ref 0–4.2)
TESTOST SERPL-MCNC: 7 NG/DL (ref 4–50)

## 2025-08-27 DIAGNOSIS — B00.1 RECURRENT COLD SORES: ICD-10-CM

## 2025-08-28 RX ORDER — ACYCLOVIR 400 MG/1
TABLET ORAL
Qty: 30 TABLET | Refills: 5 | Status: SHIPPED | OUTPATIENT
Start: 2025-08-28

## (undated) DEVICE — SENSR O2 OXIMAX FNGR A/ 18IN NONSTR

## (undated) DEVICE — BITEBLOCK OMNI BLOC

## (undated) DEVICE — TUBING, SUCTION, 1/4" X 10', STRAIGHT: Brand: MEDLINE

## (undated) DEVICE — FRCP BX RADJAW4 NDL 2.8 240CM LG OG BX40

## (undated) DEVICE — CANN NASL CO2 TRULINK W/O2 A/

## (undated) DEVICE — Device: Brand: DEFENDO AIR/WATER/SUCTION AND BIOPSY VALVE